# Patient Record
Sex: FEMALE | Race: WHITE
[De-identification: names, ages, dates, MRNs, and addresses within clinical notes are randomized per-mention and may not be internally consistent; named-entity substitution may affect disease eponyms.]

---

## 2017-06-26 ENCOUNTER — HOSPITAL ENCOUNTER (INPATIENT)
Dept: HOSPITAL 76 - ED | Age: 82
LOS: 5 days | Discharge: NURSING, REHAB OR LONG-TERM CARE | DRG: 64 | End: 2017-07-01
Attending: NURSE PRACTITIONER | Admitting: INTERNAL MEDICINE
Payer: MEDICARE

## 2017-06-26 ENCOUNTER — HOSPITAL ENCOUNTER (OUTPATIENT)
Dept: HOSPITAL 76 - EMS | Age: 82
Discharge: ANOTHER HOSPITAL | End: 2017-06-26
Attending: SURGERY
Payer: MEDICARE

## 2017-06-26 DIAGNOSIS — Z82.3: ICD-10-CM

## 2017-06-26 DIAGNOSIS — G47.33: ICD-10-CM

## 2017-06-26 DIAGNOSIS — I63.9: Primary | ICD-10-CM

## 2017-06-26 DIAGNOSIS — I63.511: ICD-10-CM

## 2017-06-26 DIAGNOSIS — I12.9: ICD-10-CM

## 2017-06-26 DIAGNOSIS — K59.00: ICD-10-CM

## 2017-06-26 DIAGNOSIS — E11.22: ICD-10-CM

## 2017-06-26 DIAGNOSIS — E11.65: ICD-10-CM

## 2017-06-26 DIAGNOSIS — M16.10: ICD-10-CM

## 2017-06-26 DIAGNOSIS — M19.019: ICD-10-CM

## 2017-06-26 DIAGNOSIS — I48.2: ICD-10-CM

## 2017-06-26 DIAGNOSIS — Z66: ICD-10-CM

## 2017-06-26 DIAGNOSIS — H53.8: ICD-10-CM

## 2017-06-26 DIAGNOSIS — R51: Primary | ICD-10-CM

## 2017-06-26 DIAGNOSIS — G81.04: ICD-10-CM

## 2017-06-26 DIAGNOSIS — I95.9: ICD-10-CM

## 2017-06-26 DIAGNOSIS — R33.9: ICD-10-CM

## 2017-06-26 DIAGNOSIS — R47.81: ICD-10-CM

## 2017-06-26 DIAGNOSIS — N18.3: ICD-10-CM

## 2017-06-26 DIAGNOSIS — B95.4: ICD-10-CM

## 2017-06-26 DIAGNOSIS — I67.2: ICD-10-CM

## 2017-06-26 DIAGNOSIS — G93.49: ICD-10-CM

## 2017-06-26 DIAGNOSIS — Z79.1: ICD-10-CM

## 2017-06-26 DIAGNOSIS — I66.03: ICD-10-CM

## 2017-06-26 DIAGNOSIS — R13.10: ICD-10-CM

## 2017-06-26 DIAGNOSIS — Z82.49: ICD-10-CM

## 2017-06-26 DIAGNOSIS — I48.91: ICD-10-CM

## 2017-06-26 DIAGNOSIS — N30.00: ICD-10-CM

## 2017-06-26 DIAGNOSIS — R29.810: ICD-10-CM

## 2017-06-26 LAB
ANION GAP SERPL CALCULATED.4IONS-SCNC: 9 MMOL/L (ref 6–13)
BASOPHILS NFR BLD AUTO: 0.1 10^3/UL (ref 0–0.1)
BASOPHILS NFR BLD AUTO: 1 %
BUN SERPL-MCNC: 18 MG/DL (ref 6–20)
CALCIUM UR-MCNC: 9.3 MG/DL (ref 8.5–10.3)
CHLORIDE SERPL-SCNC: 98 MMOL/L (ref 101–111)
CO2 SERPL-SCNC: 28 MMOL/L (ref 21–32)
CREAT SERPLBLD-SCNC: 1.4 MG/DL (ref 0.4–1)
CUL URINE ADD CHARGE: (no result)
EOSINOPHIL # BLD AUTO: 0.1 10^3/UL (ref 0–0.7)
EOSINOPHIL NFR BLD AUTO: 1.1 %
ERYTHROCYTE [DISTWIDTH] IN BLOOD BY AUTOMATED COUNT: 14.9 % (ref 12–15)
GFRSERPLBLD MDRD-ARVRAT: 36 ML/MIN/{1.73_M2} (ref 89–?)
GLUCOSE SERPL-MCNC: 273 MG/DL (ref 70–100)
HCT VFR BLD AUTO: 39.5 % (ref 37–47)
HGB UR QL STRIP: 13.5 G/DL (ref 12–16)
LYMPHOCYTES # SPEC AUTO: 2.2 10^3/UL (ref 1.5–3.5)
LYMPHOCYTES NFR BLD AUTO: 22.9 %
MCH RBC QN AUTO: 29.1 PG (ref 27–31)
MCHC RBC AUTO-ENTMCNC: 34.1 G/DL (ref 32–36)
MCV RBC AUTO: 85.5 FL (ref 81–99)
MONOCYTES # BLD AUTO: 0.7 10^3/UL (ref 0–1)
MONOCYTES NFR BLD AUTO: 7.1 %
NEUTROPHILS # BLD AUTO: 6.5 10^3/UL (ref 1.5–6.6)
NEUTROPHILS # SNV AUTO: 9.5 X10^3/UL (ref 4.8–10.8)
NEUTROPHILS NFR BLD AUTO: 67.9 %
NRBC # BLD AUTO: 0 /100WBC
PDW BLD AUTO: 7.8 FL (ref 7.9–10.8)
PH UR STRIP.AUTO: 6 PH (ref 5–7.5)
POTASSIUM SERPL-SCNC: 3.8 MMOL/L (ref 3.5–5)
RBC MAR: 4.62 10^6/UL (ref 4.2–5.4)
SODIUM SERPLBLD-SCNC: 135 MMOL/L (ref 135–145)
SP GR UR STRIP.AUTO: <=1.005 (ref 1–1.03)
UA CHARGE (STRIP ONLY): (no result)
UA W/ MICROSCOPIC CHARGE: YES
UR CULTURE IF IND: (no result)
UROBILINOGEN UR STRIP.AUTO-MCNC: NEGATIVE MG/DL
WBC # BLD: 9.5 X10^3/UL

## 2017-06-26 PROCEDURE — 99284 EMERGENCY DEPT VISIT MOD MDM: CPT

## 2017-06-26 PROCEDURE — 93005 ELECTROCARDIOGRAM TRACING: CPT

## 2017-06-26 PROCEDURE — 70547 MR ANGIOGRAPHY NECK W/O DYE: CPT

## 2017-06-26 PROCEDURE — 80048 BASIC METABOLIC PNL TOTAL CA: CPT

## 2017-06-26 PROCEDURE — 71020: CPT

## 2017-06-26 PROCEDURE — 81001 URINALYSIS AUTO W/SCOPE: CPT

## 2017-06-26 PROCEDURE — 80061 LIPID PANEL: CPT

## 2017-06-26 PROCEDURE — 70450 CT HEAD/BRAIN W/O DYE: CPT

## 2017-06-26 PROCEDURE — 93306 TTE W/DOPPLER COMPLETE: CPT

## 2017-06-26 PROCEDURE — 99285 EMERGENCY DEPT VISIT HI MDM: CPT

## 2017-06-26 PROCEDURE — 86780 TREPONEMA PALLIDUM: CPT

## 2017-06-26 PROCEDURE — 85025 COMPLETE CBC W/AUTO DIFF WBC: CPT

## 2017-06-26 PROCEDURE — 85730 THROMBOPLASTIN TIME PARTIAL: CPT

## 2017-06-26 PROCEDURE — 81003 URINALYSIS AUTO W/O SCOPE: CPT

## 2017-06-26 PROCEDURE — 84443 ASSAY THYROID STIM HORMONE: CPT

## 2017-06-26 PROCEDURE — 80053 COMPREHEN METABOLIC PANEL: CPT

## 2017-06-26 PROCEDURE — 70551 MRI BRAIN STEM W/O DYE: CPT

## 2017-06-26 PROCEDURE — 36415 COLL VENOUS BLD VENIPUNCTURE: CPT

## 2017-06-26 PROCEDURE — 84484 ASSAY OF TROPONIN QUANT: CPT

## 2017-06-26 PROCEDURE — 96374 THER/PROPH/DIAG INJ IV PUSH: CPT

## 2017-06-26 PROCEDURE — 83036 HEMOGLOBIN GLYCOSYLATED A1C: CPT

## 2017-06-26 PROCEDURE — 96372 THER/PROPH/DIAG INJ SC/IM: CPT

## 2017-06-26 PROCEDURE — 85610 PROTHROMBIN TIME: CPT

## 2017-06-26 PROCEDURE — 96375 TX/PRO/DX INJ NEW DRUG ADDON: CPT

## 2017-06-26 PROCEDURE — 70544 MR ANGIOGRAPHY HEAD W/O DYE: CPT

## 2017-06-26 PROCEDURE — 87086 URINE CULTURE/COLONY COUNT: CPT

## 2017-06-26 PROCEDURE — 51701 INSERT BLADDER CATHETER: CPT

## 2017-06-26 NOTE — XRAY REPORT
EXAM:

CHEST RADIOGRAPHY

 

EXAM DATE: 6/26/2017 10:52 PM.

 

CLINICAL HISTORY: Chest pain.

 

COMPARISON: 06/07/2015.

 

TECHNIQUE: 2 views.

 

FINDINGS: 

Lungs/Pleura: Large lung volumes consistent with emphysema. Minimal bibasilar atelectasis. No pleural
 effusion. No pneumothorax.

 

Mediastinum: Heart and mediastinal contours are unremarkable.

 

Other: None.

 

IMPRESSION: 

1. Large lung volumes consistent with emphysema. 

2. Minimal bibasilar atelectasis is unchanged. 

3. No acute abnormality.

 

[facility]

Referring Provider Line: [phone]

 

SITE ID: [id number]

## 2017-06-26 NOTE — CT REPORT
EXAM:

CT HEAD

 

EXAM DATE: 6/26/2017 10:53 PM.

 

CLINICAL HISTORY: Altered mental status, fever.

 

COMPARISON: None.

 

TECHNIQUE: Multiaxial CT images were obtained from the foramen magnum to the vertex. IV contrast: Non
e. Reformats: Coronal.

 

In accordance with CT protocol optimization, one or more of the following dose reduction techniques w
ere utilized for this exam: automated exposure control, adjustment of mA and/or KV based on patient s
ize, or use of iterative reconstructive technique.

 

FINDINGS:

Parenchyma: No intraparenchymal hemorrhage. No evidence of mass, midline shift, or CT findings of acu
te infarction. Gray-white differentiation is distinct.

 

Extraaxial Spaces: Normal for age. No subdural or epidural collections identified.

 

Ventricles: The ventricles and cortical sulci are mildly enlarged, consistent with age-related tissue
 loss.

 

Sinuses: Postsurgical changes from cataract extraction are noted in the left globe. The paranasal and
 mastoid sinuses are unremarkable.

 

Bones: No evidence of fracture or calvarial defect.

 

Other: Mild diffuse chronic microangiopathic white matter changes are evident. Mild intracranial athe
rosclerosis is noted.

 

IMPRESSION: Generalized age-related cortical atrophic changes without evidence of acute intracranial 
abnormality.

 

[facility]

Referring Provider Line: [phone]

 

SITE ID: [id number]

## 2017-06-26 NOTE — XRAY PRELIMINARY REPORT
Accession: [id number]

Exam: XR Chest 2 View PA/LAT

 

IMPRESSION: 

1. Large lung volumes consistent with emphysema. 

2. Minimal bibasilar atelectasis is unchanged. 

3. No acute abnormality.

 

[facility]

 

SITE ID: [id number]

## 2017-06-26 NOTE — CT PRELIMINARY REPORT
Accession: [id number]

Exam: CT Head W/O

 

IMPRESSION: Generalized age-related cortical atrophic changes without evidence of acute intracranial 
abnormality.

 

[facility]

 

SITE ID: [id number]

## 2017-06-26 NOTE — ED PHYSICIAN DOCUMENTATION
PD HPI FOCAL NEURO





- Stated complaint


Stated Complaint: SLURRED SPEECH, LEFT FACIAL DROOP





- History obtained from


History obtained from: Patient, Family (son), EMS





- History of Present Illness


Timing - onset: Unknown (see below regarding timeframe)


Time of symptom onset unknown: Time of onset unknown


Severity of deficit: Mild


Weakness: Face


Baseline status: positive: A&OX3, ambulatory, indep


Similar symptoms before: Has not had sx before


Recently seen: Not recently seen





- Additional information


Additional information: 





Patient's son last saw patient at her baseline at 9 AM today before he went to 

work. Upon returning home 9:15 PM, he found his mother (patient) was acting 

oddly; answering questions slowly, making references to people that weren't 

there. Medics perceived a left facial droop which appears to have resolved by 

the time of my evaluation. Patient c/o frontal headache, mild chest discomfort 

that is midline without radiation. 





Review of Systems


Constitutional: reports: Reviewed and negative


Eyes: reports: Reviewed and negative


Ears: reports: Reviewed and negative


Nose: reports: Reviewed and negative


Throat: reports: Reviewed and negative


Cardiac: reports: Chest pain / pressure.  denies: Palpitations


Respiratory: reports: Reviewed and negative


GI: reports: Reviewed and negative


: reports: Reviewed and negative


Skin: reports: Reviewed and negative


Musculoskeletal: reports: Reviewed and negative


Neurologic: reports: Confused, Altered mental status, Headache.  denies: 

Generalized weakness, Focal weakness, Numbness





PD PAST MEDICAL HISTORY





- Past Medical History


Past Medical History: Yes


Other Past Medical History: "prediabetes"





- Past Surgical History


Past Surgical History: No





- Present Medications


Home Medications: 


 Ambulatory Orders











 Medication  Instructions  Recorded  Confirmed


 


Naproxen Sodium [Aleve] 220 mg PO BID PRN 06/27/17 06/27/17














- Allergies


Allergies/Adverse Reactions: 


 Allergies











Allergy/AdvReac Type Severity Reaction Status Date / Time


 


Sulfa (Sulfonamide Allergy  Unknown Verified 06/07/15 14:09





Antibiotics)     














- Living Situation


Living Situation: reports: With family


Living Arrangement: reports: At home





- Social History


Does the pt smoke?: No





PD ED PE NORMAL





- Vitals


Vital signs reviewed: Yes





- General


General: Alert and oriented X 3, No acute distress, Well developed/nourished





- HEENT


HEENT: Atraumatic, PERRL, EOMI, Moist mucous membranes





- Neck


Neck: Supple, no meningeal sign





- Cardiac


Cardiac: RRR, No murmur





- Respiratory


Respiratory: No respiratory distress, Clear bilaterally





- Abdomen


Abdomen: Soft, Non tender





- Back


Back: No CVA TTP





- Derm


Derm: Normal color, Warm and dry





- Extremities


Extremities: No edema





- Neuro


Neuro: Alert and oriented X 3, CNs 2-12 intact, No motor deficit, No sensory 

deficit, Normal speech





NIHSS





- Level of Consciousness


Level of consciousness: (0) Alert, Keenly responsive


LOC Questions: (0) Answers both Q's correct





- Gaze


Best Gaze: (0) Normal





- Visual


Visual: (0) No loss





- Facial Palsy


Facial Palsy: (0) Normal, symmetrical movement





- Motor Arms (both separate)


Motor Arm (right): (0) No drift


Motor Arm (left): (0) No drift





- Motor Legs (both separate)


Motor Leg (right): (1) Drift


Motor Leg (left): (1) Drift





- Limb Ataxia


Limb Ataxia: (0) Absent





- Sensory


Sensory: (0) Normal





- Best Language


Best Language: (0) No aphasia





- Dysarthria


Dysarthria: (0) Normal





- Extinction and Inattention (formally neg


Extinction and inattention: (0) No abnormality





Results





- Vitals


Vitals: 


 Vital Signs - 24 hr











  06/26/17





  22:23


 


Temperature 36.6 C


 


Heart Rate 86


 


Respiratory 18





Rate 


 


Blood Pressure 190/83 H


 


O2 Saturation 98








 Oxygen











O2 Source                      Room air

















- EKG (time done)


  ** No standard instances


Rate: Rate (enter#) (81)


Rhythm: Atrial fibrillation


Axis: Normal


QRS: Normal


Ischemia: Normal ST segments





- Labs


Labs: 


 Laboratory Tests











  06/26/17 06/26/17 06/26/17





  23:07 23:39 23:39


 


WBC   9.5 


 


RBC   4.62 


 


Hgb   13.5 


 


Hct   39.5 


 


MCV   85.5 


 


MCH   29.1 


 


MCHC   34.1 


 


RDW   14.9 


 


Plt Count   186 


 


MPV   7.8 L 


 


Neut #   6.5 


 


Lymph #   2.2 


 


Mono #   0.7 


 


Eos #   0.1 


 


Baso #   0.1 


 


Absolute Nucleated RBC   0.00 


 


Nucleated RBCs   0.0 


 


PT   


 


INR   


 


APTT   


 


Sodium    135


 


Potassium    3.8


 


Chloride    98 L


 


Carbon Dioxide    28


 


Anion Gap    9.0


 


BUN    18


 


Creatinine    1.4 H


 


Estimated GFR (MDRD)    36 L


 


Glucose    273 H


 


Calcium    9.3


 


Troponin I   


 


Urine Color  LT. YELLOW  


 


Urine Clarity  CLOUDY  


 


Urine pH  6.0  


 


Ur Specific Gravity  <=1.005  


 


Urine Protein  30 H  


 


Urine Glucose (UA)  100 H  


 


Urine Ketones  NEGATIVE  


 


Urine Occult Blood  TRACE-INTA  


 


Urine Nitrite  NEGATIVE  


 


Urine Bilirubin  NEGATIVE  


 


Urine Urobilinogen  0.2 (NORMAL)  


 


Ur Leukocyte Esterase  SMALL H  


 


Urine RBC  0-5  


 


Urine WBC  11-25 H  


 


Urine WBC Clumps  PRESENT  


 


Ur Squamous Epith Cells  FEW Squamous  


 


Urine Bacteria  Moderate H  


 


Ur Microscopic Review  INDICATED  


 


Urine Culture Comments  INDICATED  














  06/26/17 06/26/17





  23:39 23:39


 


WBC  


 


RBC  


 


Hgb  


 


Hct  


 


MCV  


 


MCH  


 


MCHC  


 


RDW  


 


Plt Count  


 


MPV  


 


Neut #  


 


Lymph #  


 


Mono #  


 


Eos #  


 


Baso #  


 


Absolute Nucleated RBC  


 


Nucleated RBCs  


 


PT   12.6


 


INR   1.1


 


APTT   25.1


 


Sodium  


 


Potassium  


 


Chloride  


 


Carbon Dioxide  


 


Anion Gap  


 


BUN  


 


Creatinine  


 


Estimated GFR (MDRD)  


 


Glucose  


 


Calcium  


 


Troponin I  < 0.04 


 


Urine Color  


 


Urine Clarity  


 


Urine pH  


 


Ur Specific Gravity  


 


Urine Protein  


 


Urine Glucose (UA)  


 


Urine Ketones  


 


Urine Occult Blood  


 


Urine Nitrite  


 


Urine Bilirubin  


 


Urine Urobilinogen  


 


Ur Leukocyte Esterase  


 


Urine RBC  


 


Urine WBC  


 


Urine WBC Clumps  


 


Ur Squamous Epith Cells  


 


Urine Bacteria  


 


Ur Microscopic Review  


 


Urine Culture Comments  














- Rads (name of study)


  ** chest xray


Radiology: Prelim report reviewed, See rad report





  ** CT head


Radiology: Prelim report reviewed, See rad report





PD MEDICAL DECISION MAKING





- ED course


Complexity details: reviewed results, re-evaluated patient, considered 

differential, d/w patient, d/w family


ED course: 





On reevaluation, patient is removing cardiac leads and continues to do so when 

I ask her to please leave them in place. She is hypokinetic, answers questions 

slowly, quietly, and sometimes does not answer my questions at all. She does 

not appear angry or upset; possibly confused, frequently looks around the room 

during conversation. Her son is able to redirect her and get her to stop taking 

off the leads and lie back down on the stretcher. Patient told the ED RN that 

she (patient) was in the ED to visit a relative who is in the hospital; patient'

s son says this relative has been dead for some time now. Thus, patient's 

mental status is fluctuating in how altered it is. 





Departure





- Departure


Disposition: ED Place in Observation


Clinical Impression: 


Altered mental status


Qualifiers:


 Altered mental status type: unspecified Qualified Code(s): R41.82 - Altered 

mental status, unspecified





Urinary tract infection


Qualifiers:


 Urinary tract infection type: acute cystitis Hematuria presence: without 

hematuria Qualified Code(s): N30.00 - Acute cystitis without hematuria





Atrial fibrillation


Qualifiers:


 Atrial fibrillation type: unspecified Qualified Code(s): I48.91 - Unspecified 

atrial fibrillation


Condition: Stable


Discharge Date/Time: 06/27/17 02:03

## 2017-06-27 LAB
ALBUMIN/GLOB SERPL: 1.3 {RATIO} (ref 1–2.2)
ANION GAP SERPL CALCULATED.4IONS-SCNC: 11 MMOL/L (ref 6–13)
ANION GAP SERPL CALCULATED.4IONS-SCNC: 13 MMOL/L (ref 6–13)
APTT PPP: 25.1 SECS (ref 24.9–33.3)
BASOPHILS NFR BLD AUTO: 0.1 10^3/UL (ref 0–0.1)
BASOPHILS NFR BLD AUTO: 0.9 %
BILIRUB BLD-MCNC: 0.9 MG/DL (ref 0.2–1)
BUN SERPL-MCNC: 15 MG/DL (ref 6–20)
BUN SERPL-MCNC: 18 MG/DL (ref 6–20)
CALCIUM UR-MCNC: 8.9 MG/DL (ref 8.5–10.3)
CALCIUM UR-MCNC: 9.3 MG/DL (ref 8.5–10.3)
CHLORIDE SERPL-SCNC: 101 MMOL/L (ref 101–111)
CHLORIDE SERPL-SCNC: 102 MMOL/L (ref 101–111)
CHOLEST SERPL-MCNC: 182 MG/DL
CO2 SERPL-SCNC: 23 MMOL/L (ref 21–32)
CO2 SERPL-SCNC: 25 MMOL/L (ref 21–32)
CREAT SERPLBLD-SCNC: 1.3 MG/DL (ref 0.4–1)
CREAT SERPLBLD-SCNC: 1.5 MG/DL (ref 0.4–1)
EOSINOPHIL # BLD AUTO: 0.1 10^3/UL (ref 0–0.7)
EOSINOPHIL NFR BLD AUTO: 1.4 %
ERYTHROCYTE [DISTWIDTH] IN BLOOD BY AUTOMATED COUNT: 14.5 % (ref 12–15)
EST. AVERAGE GLUCOSE BLD GHB EST-MCNC: 186 MG/DL (ref 70–100)
GFRSERPLBLD MDRD-ARVRAT: 33 ML/MIN/{1.73_M2} (ref 89–?)
GFRSERPLBLD MDRD-ARVRAT: 39 ML/MIN/{1.73_M2} (ref 89–?)
GLOBULIN SER-MCNC: 3.2 G/DL (ref 2.1–4.2)
GLUCOSE SERPL-MCNC: 178 MG/DL (ref 70–100)
GLUCOSE SERPL-MCNC: 206 MG/DL (ref 70–100)
HBA1C BLD-MCNC: 0.96 G/DL
HCT VFR BLD AUTO: 39.4 % (ref 37–47)
HDLC SERPL-MCNC: 36 MG/DL
HDLC SERPL: 5.1 {RATIO} (ref ?–4.4)
HGB UR QL STRIP: 13.5 G/DL (ref 12–16)
INR PPP: 1.1 (ref 0.8–1.2)
INR PPP: 1.1 (ref 0.8–1.2)
LDLC/HDLC SERPL: 3.3 {RATIO} (ref ?–4.4)
LYMPHOCYTES # SPEC AUTO: 2.5 10^3/UL (ref 1.5–3.5)
LYMPHOCYTES NFR BLD AUTO: 24.7 %
MCH RBC QN AUTO: 29.3 PG (ref 27–31)
MCHC RBC AUTO-ENTMCNC: 34.4 G/DL (ref 32–36)
MCV RBC AUTO: 85.2 FL (ref 81–99)
MONOCYTES # BLD AUTO: 0.8 10^3/UL (ref 0–1)
MONOCYTES NFR BLD AUTO: 7.7 %
NEUTROPHILS # BLD AUTO: 6.6 10^3/UL (ref 1.5–6.6)
NEUTROPHILS # SNV AUTO: 10.1 X10^3/UL (ref 4.8–10.8)
NEUTROPHILS NFR BLD AUTO: 65.3 %
NRBC # BLD AUTO: 0 /100WBC
PDW BLD AUTO: 8.1 FL (ref 7.9–10.8)
POTASSIUM SERPL-SCNC: 3.5 MMOL/L (ref 3.5–5)
POTASSIUM SERPL-SCNC: 3.9 MMOL/L (ref 3.5–5)
PROT SPEC-MCNC: 7.4 G/DL (ref 6.7–8.2)
PROTHROM ACT/NOR PPP: 12.6 SECS (ref 9.9–12.6)
PROTHROM ACT/NOR PPP: 12.6 SECS (ref 9.9–12.6)
RBC MAR: 4.62 10^6/UL (ref 4.2–5.4)
SODIUM SERPLBLD-SCNC: 135 MMOL/L (ref 135–145)
SODIUM SERPLBLD-SCNC: 140 MMOL/L (ref 135–145)
TRIGL P FAST SERPL-MCNC: 139 MG/DL
VLDLC SERPL-SCNC: 28 MG/DL
WBC # BLD: 10.1 X10^3/UL

## 2017-06-27 RX ADMIN — Medication SCH MG: at 08:39

## 2017-06-27 RX ADMIN — INSULIN ASPART SCH UNIT: 100 INJECTION, SOLUTION INTRAVENOUS; SUBCUTANEOUS at 21:57

## 2017-06-27 RX ADMIN — SODIUM CHLORIDE SCH MLS/HR: 9 INJECTION, SOLUTION INTRAVENOUS at 13:54

## 2017-06-27 RX ADMIN — POLYETHYLENE GLYCOL 3350 SCH GM: 17 POWDER, FOR SOLUTION ORAL at 08:39

## 2017-06-27 RX ADMIN — Medication SCH: at 20:53

## 2017-06-27 RX ADMIN — SODIUM CHLORIDE, PRESERVATIVE FREE SCH: 5 INJECTION INTRAVENOUS at 21:59

## 2017-06-27 RX ADMIN — PHENAZOPYRIDINE SCH MG: 100 TABLET ORAL at 21:58

## 2017-06-27 RX ADMIN — SODIUM CHLORIDE, PRESERVATIVE FREE SCH: 5 INJECTION INTRAVENOUS at 14:00

## 2017-06-27 RX ADMIN — SODIUM CHLORIDE SCH MLS/HR: 9 INJECTION, SOLUTION INTRAVENOUS at 12:11

## 2017-06-27 RX ADMIN — ASPIRIN SCH MG: 81 TABLET, CHEWABLE ORAL at 12:26

## 2017-06-27 RX ADMIN — INSULIN ASPART SCH UNIT: 100 INJECTION, SOLUTION INTRAVENOUS; SUBCUTANEOUS at 08:42

## 2017-06-27 RX ADMIN — INSULIN ASPART SCH UNIT: 100 INJECTION, SOLUTION INTRAVENOUS; SUBCUTANEOUS at 16:52

## 2017-06-27 RX ADMIN — INSULIN ASPART SCH UNIT: 100 INJECTION, SOLUTION INTRAVENOUS; SUBCUTANEOUS at 12:11

## 2017-06-27 RX ADMIN — SODIUM CHLORIDE, PRESERVATIVE FREE SCH: 5 INJECTION INTRAVENOUS at 05:10

## 2017-06-27 RX ADMIN — SODIUM CHLORIDE SCH MLS/HR: 9 INJECTION, SOLUTION INTRAVENOUS at 02:21

## 2017-06-27 RX ADMIN — ENOXAPARIN SODIUM SCH MG: 100 INJECTION SUBCUTANEOUS at 08:42

## 2017-06-27 RX ADMIN — ATORVASTATIN CALCIUM SCH MG: 40 TABLET, FILM COATED ORAL at 21:42

## 2017-06-27 NOTE — MRI PRELIMINARY REPORT
Accession: [id number]

Exam: MRI Brain W/O

 

IMPRESSION: 

1. 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 
right peritrigonal white matter. Distribution corresponds to right MCA and/or right MCA watershed ter
ritories. No evidence of corresponding hemorrhage. 

2. Absence of the right MCA flow void, concerning for occlusion.

 

[facility]

 

The above findings  were discussed with Dr. Riggins by Dr. Maxime Vargas at 11:50 hrs on 6/27/17.

 

SITE ID: [id number]

## 2017-06-27 NOTE — MRI PRELIMINARY REPORT
Accession: [id number]

Exam: MRI Angio Brain W/O (MRA)

 

IMPRESSION: 

1. 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 No reconstitution is visualized. 

2. High-grade stenosis (greater than 75% luminal narrowing) and moderate to high-grade stenosis (50-7
0% luminal narrowing) of the left MCA frontal and temporal M2 trunks, respectively. 

3. High-grade stenosis or occlusion of the PCAs bilaterally with reconstitution of distal branches, r
ight greater than left.

 

CRITICAL RESULT: The findings were discussed with Dr. Riggins at 11:50 on 06/27/2017.

 

[facility]

 

SITE ID: [id number]

## 2017-06-27 NOTE — HISTORY & PHYSICAL EXAMINATION
Chief Complaint





- Chief Complaint


Chief Complaint: altered mental status





History of Present Illness





- Admitted From


Admitted From:: emergency department





- History Obtained From


Records Reviewed: yes


History obtained from: patient and patient's son


Exam Limitations: poor historian secondary to altered mental status





- History of Present Illness


HPI Comment/Other: 





Patient is an 83-year-old female with a past medical history significant for 

diabetes not on any treatment, osteoarthritis of the shoulder and hip on daily 

Aleve who does not regularly see a doctor presents to the emergency room 

accompanied by her son who states that she has had altered mental status since 

this evening. The history is mostly provided by the patient's son as the 

patient was confused. The patient's son states that the patient was in her 

normal state of health at 10 AM this morning when he left the house. He states 

he returned home at 9 PM this evening and noticed his mom was very confused. He 

states that he would ask her questions and she will be very slow to respond. 

She would respond with answers that often did not make any sense. He states 

that she almost looked as though she was in a daze. He states that she was 

staring off into space. He describes it as "she appeared to be stone". He 

states he did not notice any facial droop and she did not have any focal 

weakness. He states that the only thing she complained of was a headache. He 

states that she was very slow to move. He states at 9:30 he called 911 because 

this was very odd for her. He states that she has not been complaining of any 

cough, fever, chills, urinary symptoms, abdominal pain, and he has not noted 

any nausea vomiting or diarrhea over the last several days. He states that she 

was in her normal state of health this morning. The patient herself denies any 

problems aside from a frontal headache. She cannot tell me how long it's been 

going on but she states it hurts. The patient's son states that his mom asked 

him earlier how long it had been since he saw his neighbor who  6 months 

ago. Also when I asked the patient why she was at the hospital she stated that 

she was here to see a family member whom her son stated had also passed away 

many years ago. The son states that this is nothing like her norm. He stated 

that she does tend to be forgetful but she is fully aware that these people 

have passed away. He states that she is completely independent she still 

performs all her ADLs independently and she still drives.





On presentation to the emergency department the patient was afebrile and vital 

signs were normal aside from the fact that her blood pressure was very elevated 

at 190/83. The patient's lab work revealed a normal WBC, normal hemoglobin, 

normal INR and normal electrolytes aside from slightly elevated creatinine of 

1.4 and a glucose of 273. The patient's troponin was negative. Her UA did have 

small leukocyte Estrace, 11-25 WBCs and moderate bacteria. The patient's CT 

head showed age-related cortical atrophic changes without evidence of acute 

intracranial abnormality. The patient's chest x-ray reveals large lung volumes 

consistent with emphysema but no acute abnormality. The patient's mentation did 

not improve back to her baseline during the time she spent in the emergency 

department. She was started on IV antibiotics for treatment of a urinary tract 

infection. She was placed in observation for acute encephalopathy and will be 

monitored overnight.





Review of Systems





- Constitutional


Constitutional: denies: Fatigue, Fever, Chills, Malaise, Weakness, Poor appetite

, Diaphoresis, Night sweats, Weight gain, Weight loss





- Eyes


Eyes: denies: Pain, Irritation, Amaurosis, Blurred vision, Spots in vision, 

Field loss, Vision loss, Dipolpia





- Ears, Nose & Throat


Ears, Nose & Throat: denies: Ear pain, Hearing loss, Hearing aids, Tinnitus, 

Vertigo, Nasal pain, Nasal discharge, Nosebleeds, Nasal obstruction, Nasal 

congestion, Postnasal drainage, Sore throat, Hoarseness, Mouth lesions





- Cardiovascular


Cariovascular: denies: Irregular heart rate, Palpitations, Chest pain, Edema, 

Lightheadedness, Syncope, Exertional dyspnea, Decr. exercise tolerance, 

Orthopnea





- Respiratory


Respiratory: denies: Cough, Sputum production, Wheezing, Snoring, Hemoptysis, 

Orthopnea, SOB at rest, SOB with exertion, Pleuritic pain





- Gastrointestinal


Gastrointestinal: denies: Abdominal pain, Abdominal distention, Constipation, 

Diarrhea, Change in bowel habits, Black stools, Bloody stools, Nausea, Vomiting

, Bile emesis, Coffee grounds emesis, Poor appetite





- Genitourinary


Genitourinary: denies: Dysuria, Frequency, Urgency, Hematuria, Nocturia





- Musculoskeletal


Musculoskeletal: reports: Muscle aches, Joint pain.  denies: Muscle pain, Back 

pain, Stiffness, Limited range of motion, Muscle weakness, Joint swelling





- Integumentary


Integumentary: denies: Rash, Pruritis, Lesions, Dryness, Pigment changes, Nail 

changes





- Neurological


Neurological: reports: Headache (Frontal), Memory problems, Other (confusion).  

denies: General weakness, Focal weakness, Dizziness, Abnormal gait, Seizures, 

Slurred speech





- Psychiatric


Psychiatric: denies: Depression, Anxiety, Delusions





- Endocrine


Endocrine: denies: Polyuria, Polydypsia, Polyphagia, Intolerance to cold, 

Intolerance to heat





- Hematologic/Lymphatic


Hematologic/Lymphatic: denies: Anemia, Bruising





History





- Past Medical History


Cardiovascular: reports: Hypertension


Respiratory: reports: None


Neuro: reports: None


Endocrine/Autoimmune: reports: Type 2 diabetes


GI: reports: None


GYN: reports: None


: reports: None


HEENT: reports: None


Psych: reports: None


Musculoskeletal: reports: Osteoarthritis


Derm: reports: None


MRSA Hx?: No


Other Past Medical History: 1. Diabetes not on any treatment.  2. Chronic 

kidney disease.  3. Osteoarthritis shoulder and hip.  4. Atrial fibrillation 

not on Coumadin or rate control medication.  5. Hypertension





- Past Surgical History


General: reports: Cholecystectomy, Appendectomy


/GYN: reports: Hysterectomy


HEENT: reports: Tonsil/Adenoidectomy





- Family & Social History


Family History: Mother: CVA/TIA (Stroke in 80s), Father: CAD, Brother:  

(Old age)


Family History Comment/Other: 





The patient's mother  of a stroke in her 80s. The patient's father  of 

an MI. Her brother  of old age. There is no family history of cancer or 

diabetes


Living arrangement: At home


Living Situation: With family (Son)


Social History Notes: Patient lives and [place] with her son. Her son moved 

in with her 2 years ago he previously lived in [place]. Patient grew up in 

[place] she also lived in [place] and in [place] for some time. She was  

for over 50 years and is now  her  passed away 3-1/2 years ago. 

She is fully independent, performs all her ADLs independently and still drives. 

She has 2 sons one of whom lives in [facility] the other lives with 

her. The patient has never smoked she rarely drinks alcohol and denies any 

illicit drug use.





- Substance History


Use: Uses substance without health or social issues: NONE


Abuse: Recurrent use of substance despite neg consequences: NONE


Dependence: Experiences withdrawal or developed tolerances: NONE





- POLST


Patient has POLST: No


POLST Status: Full Code





Meds/Allgy





- Home Medications


Home Medications: 


 Ambulatory Orders











 Medication  Instructions  Recorded  Confirmed


 


Naproxen Sodium [Aleve] 220 mg PO BID PRN 17














- Allergies


Allergies/Adverse Reactions: 


 Allergies











Allergy/AdvReac Type Severity Reaction Status Date / Time


 


Sulfa (Sulfonamide Allergy  Unknown Verified 06/07/15 14:09





Antibiotics)     














Exam





- Vital Signs


Reviewed Vital Signs: Yes


Vital Signs: 





 Vital Signs x48h











  Pulse Resp BP Pulse Ox


 


 17 02:18    210/71 H 


 


 17 01:45  85  18  205/88 H  94














- Physical Exam


General Appearance: positive: Alert, Other (Patient is confused, she thinks she 

is in the hospital to see a relative who has already passed away. She can't 

tell me the year, where she is, and her name. At time she is slow to process 

and slow to answer questions.)


Eyes Bilateral: positive: Normal inspection, PERRL, EOMI, No lid inflammation, 

Conjunctivae nml, No scleral icterus


ENT: positive: ENT inspection nml, Pharynx nml, No signs of dehydration.  

negative: Purulent nasal drainage, Pharyngeal erythema, Oral lesions


Neck: positive: Nml inspection, Thyroid nml, No JVD, Trachea midline.  negative

: Thyromegaly, Lymphadenopathy (R), Lymphadenopathy (L)


Respiratory: positive: Chest non-tender, No respiratory distress, Breath sounds 

nml.  negative: Wheezes, Rales, Rhonchi


Cardiovascular: positive: No murmur, No gallop, Irregularly irregular


Peripheral Pulses: positive: 2+


Abdomen: positive: Non-tender, No organomegaly, Nml bowel sounds, No 

distention.  negative: Guarding, Rebound, Hepatomegaly


Back: positive: Nml inspection.  negative: CVA tenderness (R), CVA tenderness (L

)


Skin: positive: Color nml, No rash, Warm.  negative: Cyanosis, Pallor


Extremities: positive: Non-tender, Full ROM, Nml appearance, No pedal edema


Neurologic/Psychiatric: positive: Oriented x3, CN's nml (2-12), Motor nml, 

Sensation nml, Mood/affect nml, Other (Slow to answer questions. Confused. Poor 

memory.)





Conclusion/Plan





- Problem List


(1) Acute encephalopathy


Conclusion/Plan: 


Patient appears to have waxing and waning encephalopathy. Which appears to be 

worse at times. She does not appear to have any focal neurologic deficits. 

Although according to the paramedics she did have a slight left facial droop 

which had completely resolved by the time she came into the emergency 

department.


The patient appears to be confused and at times is slow to process and slow to 

answer questions. She does complain of a frontal headache.





The etiology of the patient's acute encephalopathy is not completely obvious as 

she did not have any major electrolyte abnormalities. She does not have a focal 

neurologic deficit. She does appear to have a UTI however she does not have a 

leukocytosis or fever or any signs of sepsis. She does have an elevated blood 

sugar but has a history of diabetes and does not appear to be in DKA. She is 

very hypertensive on presentation to the emergency department and continues to 

be hypertensive on presentation to the medical del real. It is possible that the 

patient could have hypertensive encephalopathy especially given her frontal 

headache. The patient has untreated diabetes and possibly also has untreated 

hypertension and therefore has risk factors for TIA/stroke. It is possible that 

the patient symptoms are secondary to TIA or stroke. The patient's CT head was 

negative and chest x-ray was negative.Patient could also be having subclinical 

seizures.





Plan:


Place patient in observation


ASA, lipitor, lipid profile


Neurochecks


Treat for UTI with IV ceftriaxone


Give IV fluids


Check TSH, B12, folate, RPR


Check U. tox


CTA head and neck, MRI of brain


Echocardiogram


Consider LP if patient is not improving


Consider EEG and neurology consultation if not improving








(2) Urinary tract infection


Conclusion/Plan: 


Patient presented with acute encephalopathy and was found to have positive UA 

although she denies any urinary symptoms. She has normal WBC and no fevers.





Plan:


Treat with IV ceftriaxone


Await urine culture


Qualifiers: 


   Urinary tract infection type: acute cystitis   Hematuria presence: without 

hematuria   Qualified Code(s): N30.00 - Acute cystitis without hematuria   





(3) Hypertension


Conclusion/Plan: 


Patient's blood pressure is very elevated in the emergency department and 

continued to be elevated on presentation to [facility] with systolic blood 

pressure greater than 200.


This could be secondary to undiagnosed hypertension or secondary to ongoing 

stroke.


Patient's encephalopathy could be secondary to hypertensive emergency but could 

also be secondary to a TIA or stroke and hypertension could also be secondary 

to TIA or stroke.





Plan:


Place patient on IV hydralazine when necessary to maintain blood pressure less 

than 190 systolic and 105 diastolic


This will allow for permissive hypertension and should also bring blood 

pressure down to an adequate range.


Monitor


Consider starting oral antihypertensive








(4) Diabetes


Conclusion/Plan: 


Patient has history of diabetes but has never been on any medications. Patient 

does not regularly followup with her PCP.


On presentation patient's blood sugar is elevated at 273.


Hemoglobin A1c is 8.1





Plan:


Diabetic diet


Diabetic education once mental status improves


Sliding scale insulin


Blood sugar checks a.c. at bedtime


Patient will need to be started on metformin at discharge


Qualifiers: 


   Diabetes mellitus type: type 2 





(5) Atrial fibrillation


Conclusion/Plan: 


Patient appears to be in atrial fibrillation on EKG although rate is controlled.


There do not appear to be any P waves and she has an irregular rhythm.





Plan:


Monitor on telemetry


Echocardiogram


If patient has had a stroke or TIA will need to consider anticoagulation


Patient will need outpatient cardiology followup


Qualifiers: 


   Atrial fibrillation type: unspecified   Qualified Code(s): I48.91 - 

Unspecified atrial fibrillation   





(6) CKD (chronic kidney disease)


Conclusion/Plan: 


Patient's creatinine is 1.4 on presentation


GFR is 36


She has a CKD stage III


Likely secondary to diabetes and possibly hypertension





Plan:


Monitor creatinine


Avoid nephrotoxic agents


Give IV fluids


Qualifiers: 


   Chronic kidney disease stage: stage 3 (moderate)   Qualified Code(s): N18.3 

- Chronic kidney disease, stage 3 (moderate)   





(7) Prophylactic use of low molecular weight heparin for venous thromboembolism


Conclusion/Plan: 


Placed on Lovenox while hospitalized








- Lab Results


Lab results reviewed: Yes


Fabian Bones: 


 17 23:39





 17 23:39





- Diagnostic Imaging Results


Diagnostic Imaging Results: positive: Final report reviewed





- EKG Results


EKG Interpreted Independently: Yes





Issues/Core Measures





- Anticipated LOS


Anticipated Stay Length: Less than 2 midnights





- DVT/VTE - Prophylaxis


VTE/DVT Prophylaxis med ordered at admit?: Yes

## 2017-06-27 NOTE — PROVIDER PROGRESS NOTE
Subjective





- Prog Note Date


Prog Note Date: 06/27/17


Prog Note Time: 07:24





- Subjective


Subjective: 





H/P, data reviewed


I'm at [place], 2017 (later in day says its 1998 or orther year


identifies son,








Current Medications





- Current Medications


Current Medications: 








Active Medications











Generic Name Dose Route Start Last Admin





  Trade Name Freq  PRN Reason Stop Dose Admin


 


Acetaminophen  650 mg 06/27/17 01:10  





  Tylenol  PO   





  Q4HR PRN   





  Pain 1 to 4   


 


Aspirin  325 mg 06/27/17 08:00  





  Ti  PO   





  DAILYWM RAYNA   


 


Atorvastatin Calcium  80 mg 06/27/17 21:00  





  Lipitor  PO   





  QPM RAYNA   


 


Enoxaparin Sodium  40 mg 06/27/17 09:00  





  Lovenox  SUBQ   





  DAILY RAYNA   


 


Hydralazine HCl  10 mg 06/27/17 01:16 06/27/17 02:18





  Apresoline Inj  IVP   10 mg





  Q6H PRN   Administration





  Hypertension   


 


Sodium Chloride  1,000 mls @ 100 mls/hr 06/27/17 02:00 06/27/17 02:21





  Normal Saline 0.9%  IV   100 mls/hr





  .Q10H RAYNA   Administration


 


Ceftriaxone Sodium 1 gm/  100 mls @ 200 mls/hr 06/28/17 01:00  





  Sodium Chloride  IV   





  Q24H RAYNA   


 


Insulin Aspart  1 - 5 unit 06/27/17 08:00  





  Novolog  SUBQ   





  0800,1200,1700,2100 RAYNA   





  Protocol   


 


Ondansetron HCl  4 mg 06/27/17 01:10  





  Zofran Inj  IVP   





  Q6HR PRN   





  Nausea / Vomiting   


 


Oxycodone HCl  5 mg 06/27/17 01:10  





  Roxicodone  PO   





  Q4HR PRN   





  Pain 5 to 7   


 


Pantoprazole Sodium  40 mg 06/27/17 07:00 06/27/17 06:11





  Protonix  PO   40 mg





  QDAC RAYNA   Administration


 


Polyethylene Glycol  17 gm 06/27/17 09:00  





  Miralax  PO   





  DAILY RAYNA   


 


Saccharomyces Boulardii  250 mg 06/27/17 08:00  





  Florastor  PO   





  BIDWM RAYNA   


 


Sodium Chloride  10 ml 06/27/17 01:10  





  Normal Saline Flush 0.9%  IVP   





  PRN PRN   





  AS NEEDED PER PROVIDER ORDERS   


 


Sodium Chloride  10 ml 06/27/17 06:00 06/27/17 05:10





  Normal Saline Flush 0.9%  IVP   Not Given





  Q8HR RAYNA   








 





Naproxen Sodium [Aleve] 220 mg PO BID PRN 06/27/17 











Objective





- Vital Signs/Intake & Output


Reviewed Vital Signs: Yes


Vital Signs: 





 Vital Signs x48h











  Temp Pulse Pulse Pulse Resp BP BP


 


 06/27/17 05:39  36.8 C   61   18   180/84 H


 


 06/27/17 03:17        200/82 H


 


 06/27/17 02:57        188/72 H


 


 06/27/17 02:48       190/70 H 


 


 06/27/17 02:43        193/77 H


 


 06/27/17 02:40        197/76 H


 


 06/27/17 02:34        182/80 H


 


 06/27/17 02:26     64    170/92 H


 


 06/27/17 02:24  36.8 C    65  18   210/71 H


 


 06/27/17 02:18       210/71 H 


 


 06/27/17 01:45   85    18  205/88 H 














  Pulse Ox


 


 06/27/17 05:39  97


 


 06/27/17 03:17 


 


 06/27/17 02:57 


 


 06/27/17 02:48 


 


 06/27/17 02:43 


 


 06/27/17 02:40 


 


 06/27/17 02:34 


 


 06/27/17 02:26 


 


 06/27/17 02:24  98


 


 06/27/17 02:18 


 


 06/27/17 01:45  94











Intake & Output: 





 Intake & Output











 06/24/17 06/25/17 06/26/17 06/27/17





 23:59 23:59 23:59 23:59


 


Intake Total    487


 


Balance    487














- Objective


Neurologic/Psychiatric: positive: Other (awake, alert, slow to respond to some 

questions, answers other questions quickly said it was march, but 2017, [facility] 

president, [facility] previously, in [place] R gaze preference, strenght exam (B 

hand , shoulder adduction/abduction, hip flexion extension, dorsi plantar 

flexion AND shoulder shrug all with subtle reduction on L (5/5 R, 4/5 L)   

extraocular movements intact, pupils equal and reactive no obvius facial droop)





- Lab Results


Fish Bones: 


 06/28/17 05:30





 06/28/17 05:30


Other Labs: 





 Lab Results x24hrs











  06/27/17 06/27/17 06/27/17 Range/Units





  05:05 05:05 05:05 


 


WBC     (4.8-10.8)  x10^3/uL


 


RBC     (4.20-5.40)  10^6/uL


 


Hgb     (12.0-16.0)  g/dL


 


Hct     (37.0-47.0)  %


 


MCV     (81.0-99.0)  fL


 


MCH     (27.0-31.0)  pg


 


MCHC     (32.0-36.0)  g/dL


 


RDW     (12.0-15.0)  %


 


Plt Count     (130-450)  10^3/uL


 


MPV     (7.9-10.8)  fL


 


Neut #     (1.5-6.6)  10^3/uL


 


Lymph #     (1.5-3.5)  10^3/uL


 


Mono #     (0.0-1.0)  10^3/uL


 


Eos #     (0.0-0.7)  10^3/uL


 


Baso #     (0.0-0.1)  10^3/uL


 


Absolute Nucleated RBC     x10^3/uL


 


Nucleated RBCs     /100WBC


 


PT     (9.9-12.6)  secs


 


INR     (0.8-1.2)  


 


Sodium    140  (135-145)  mmol/L


 


Potassium    3.5  (3.5-5.0)  mmol/L


 


Chloride    102  (101-111)  mmol/L


 


Carbon Dioxide    25  (21-32)  mmol/L


 


Anion Gap    13.0  (6-13)  


 


BUN    15  (6-20)  mg/dL


 


Creatinine    1.3 H  (0.4-1.0)  mg/dL


 


Estimated GFR (MDRD)    39 L  (>89)  


 


Glucose    178 H  ()  mg/dL


 


Glycated Hemoglobin     (4.6-6.2)  %


 


Estim Average Glucose     ()  


 


Calcium    9.3  (8.5-10.3)  mg/dL


 


Total Bilirubin    0.9  (0.2-1.0)  mg/dL


 


AST    21  (10-42)  IU/L


 


ALT    14  (10-60)  IU/L


 


Alkaline Phosphatase    63  ()  IU/L


 


Total Protein    7.4  (6.7-8.2)  g/dL


 


Albumin    4.2  (3.2-5.5)  g/dL


 


Globulin    3.2  (2.1-4.2)  g/dL


 


Albumin/Globulin Ratio    1.3  (1.0-2.2)  


 


Triglycerides  139   ( - 149) mg/dL


 


Cholesterol  182   ( - 199) mg/dL


 


LDL Cholesterol, Calc  118   ( - 129) mg/dL


 


VLDL Cholesterol  28    mg/dL


 


HDL Cholesterol  36 L   (60 - ) mg/dL


 


LDL/HDL Ratio  3.3    (<4.4)  


 


Cholesterol/HDL Ratio  5.1    (<4.4)  


 


TSH   2.70   (0.34-5.60)  uIU/mL














  06/27/17 06/27/17 06/27/17 Range/Units





  05:05 05:05 01:20 


 


WBC   10.1   (4.8-10.8)  x10^3/uL


 


RBC   4.62   (4.20-5.40)  10^6/uL


 


Hgb   13.5   (12.0-16.0)  g/dL


 


Hct   39.4   (37.0-47.0)  %


 


MCV   85.2   (81.0-99.0)  fL


 


MCH   29.3   (27.0-31.0)  pg


 


MCHC   34.4   (32.0-36.0)  g/dL


 


RDW   14.5   (12.0-15.0)  %


 


Plt Count   196   (130-450)  10^3/uL


 


MPV   8.1   (7.9-10.8)  fL


 


Neut #   6.6   (1.5-6.6)  10^3/uL


 


Lymph #   2.5   (1.5-3.5)  10^3/uL


 


Mono #   0.8   (0.0-1.0)  10^3/uL


 


Eos #   0.1   (0.0-0.7)  10^3/uL


 


Baso #   0.1   (0.0-0.1)  10^3/uL


 


Absolute Nucleated RBC   0.00   x10^3/uL


 


Nucleated RBCs   0.0   /100WBC


 


PT  12.6    (9.9-12.6)  secs


 


INR  1.1    (0.8-1.2)  


 


Sodium     (135-145)  mmol/L


 


Potassium     (3.5-5.0)  mmol/L


 


Chloride     (101-111)  mmol/L


 


Carbon Dioxide     (21-32)  mmol/L


 


Anion Gap     (6-13)  


 


BUN     (6-20)  mg/dL


 


Creatinine     (0.4-1.0)  mg/dL


 


Estimated GFR (MDRD)     (>89)  


 


Glucose     ()  mg/dL


 


Glycated Hemoglobin    8.1 H  (4.6-6.2)  %


 


Estim Average Glucose    186 H  ()  


 


Calcium     (8.5-10.3)  mg/dL


 


Total Bilirubin     (0.2-1.0)  mg/dL


 


AST     (10-42)  IU/L


 


ALT     (10-60)  IU/L


 


Alkaline Phosphatase     ()  IU/L


 


Total Protein     (6.7-8.2)  g/dL


 


Albumin     (3.2-5.5)  g/dL


 


Globulin     (2.1-4.2)  g/dL


 


Albumin/Globulin Ratio     (1.0-2.2)  


 


Triglycerides    ( - 149) mg/dL


 


Cholesterol    ( - 199) mg/dL


 


LDL Cholesterol, Calc    ( - 129) mg/dL


 


VLDL Cholesterol     mg/dL


 


HDL Cholesterol    (60 - ) mg/dL


 


LDL/HDL Ratio     (<4.4)  


 


Cholesterol/HDL Ratio     (<4.4)  


 


TSH     (0.34-5.60)  uIU/mL














- Diagnostic Imaging


Diagnostic Imaging Results: positive: Final report reviewed, Discussed with 

radiologist (MRI brain; scattered acute infacrct 6 hours to 7 days in R frontal 

periventricular WM and right peritrigonal WM.  Distribution corrresponds to R 

MCA and /or R MCA watershed territories. No hemorrhage.  MRA brain, neck  R 

occlusion R MCA beginning at origin; no enhancement distal branches.   PCA Right

; focal high grade stenosis or occlusion distal P1 segment;  Absence of R MCA 

flow void; concerning for occclusion lEFT:  l mca ; PATENT BUT HGH GRADE 

STENOSIS; > 75% LUMINAL HNARROWING, MODERATE HIGH GRADE STENOSIS 50-70% LUMINAL 

NARROWING PROXIMAL m2 BRANCH.  pca lEFT; p1 SEGMENT NOT WELL VISUALIZED ; MOST 

LIKELY HIGH GRADE STENOSIS OR OCCLUSION . p2 SEGMENT 50-70% LUMINAL NARROWING)





Assessment/Plan





- Problem List


(1) Acute ischemic stroke


Impression: 


 and francisco ALSO cardioembolic after 2nd discussion w/ [place] neurologist


contacted by radiologist after MRI (see under objective)


 2 areas of acute stroke, as well as occlusion origin of R MCA, AND significant 

disease L MCAalso significant atherosclerotic disease bilateral PCA's


 carotids < 50% bilaterally


 exam c/w R MCA stroke w/ L neglect and field deficit and L neglect somewhat 

more pronounced this afternoon


 have already started ASA and statin


spoke with neurologist x 2 today


  given progressive symptoms spoke Maur[place] again (? when to repeat CT to ensure 

no hemorrhagic conversion), when to start anticoag


She advised CTA would help her determine better where there is flow (suggested 

that MRA overread the areas of no flow


Radiologist Dr. Costa indicated he would not do with current GFR (this am 

was 39)





 allow permissive HTN (stopped prn hydralazine)


 PT., OT eval


 contacted SLP (Neelima) for eval this afternoon, son indicates she took sip 

water and "it went down wrong way"


SLP advises dysphagia mechanical diet, thins, upright w/ supervision, check for 

pocketing on Left, and empty mouth after meal


will d/w CM in am; likely needs neuro skilled facility





 


 











(3) Atrial fibrillation


Impression: 


12 lead EKG done this am; rate controlled afib


no known prior dx


echo pending today


Qualifiers: 


   Atrial fibrillation type: unspecified   Qualified Code(s): I48.91 - 

Unspecified atrial fibrillation   





(4) Hypertension


Impression: 


as above, permissive hypertension / acute stroke











(5) CKD (chronic kidney disease)


Impression: 


will recheckCr in am following IVF


 likely CKD from chronic untreated DM


(dont know baseline but this is likely close)


Qualifiers: 


   Chronic kidney disease stage: stage 3 (moderate)   Qualified Code(s): N18.3 

- Chronic kidney disease, stage 3 (moderate)   





(6) Urinary tract infection


Impression: 


not likely r/t her presentation


on ceftriaxone


f/u culture


Qualifiers: 


   Urinary tract infection type: acute cystitis   Hematuria presence: without 

hematuria   Qualified Code(s): N30.00 - Acute cystitis without hematuria

## 2017-06-27 NOTE — MRI REPORT
EXAM:

MR ANGIOGRAM NECK WITHOUT CONTRAST

 

EXAM DATE: 6/27/2017 11:03 AM.

 

CLINICAL HISTORY: 83-year-old woman with altered mental status and facial droop. Concern for stroke.

 

COMPARISON: None.

 

TECHNIQUE: Multiplanar, multisequence MRA sequences of the neck were performed. Other: None. Post-pro
cessing: Multiplanar 3D MIP reconstructions. IV Contrast: None.  Evaluation of arterial stenosis is b
ased on a NASCET method of measurement.

 

FINDINGS:

RIGHT

Common and Internal carotid: Patent. No dissection or significant stenosis. Flow-related artifact arcos
its fine evaluation at the bifurcation and distal cervical ICA.

External Carotid: Patent. No dissection or significant stenosis.

Vertebral: Patent. No dissection or significant stenosis. Flow-related artifact limits evaluation of 
the V3 segment.

 

LEFT

Common and Internal carotid: Patent. Apparent narrowing at the proximal ICA may reflect underlying at
herosclerotic disease versus flow-related artifact, but possible narrowing is less than 50%. 

External Carotid: Patent. No dissection or significant stenosis.

Vertebral: Patent. No dissection or significant stenosis. Flow-related artifact limits evaluation of 
the V3 segment.

 

 

Other: Limited evaluation of the neck soft tissues is unremarkable.

 

IMPRESSION: 

1. Carotid and vertebral arteries are patent without significant stenosis or dissection. 

2. Fine evaluation is limited on this noncontrast exam due to flow-related artifact.

 

[facility]

Referring Provider Line: [phone]

 

SITE ID: [id number]

## 2017-06-27 NOTE — MRI REPORT
EXAM

MRA BRAIN

 

EXAM DATE: 6/27/2017 10:39 AM.

 

CLINICAL HISTORY: 83-year-old woman with facial droop and altered mental status. Concern for stroke.

 

COMPARISON: None.

 

TECHNIQUE: Multiplanar, multisequence MRA sequences of the brain were performed. Other: None. Post-pr
ocessing: Multiplanar 3D MIP reconstructions. IV Contrast: None.

 

FINDINGS:

RIGHT:

Visualized Internal Carotid Artery: Patent without significant stenosis. No evidence of aneurysm.

 

Anterior Cerebral Artery: Patent without significant stenosis or aneurysm. 

Middle Cerebral Artery: There is occlusion of the right MCA beginning at the origin. No enhancement i
s visualized in the distal branches. 

Posterior Cerebral Artery: Focal high-grade stenosis or occlusion is present in the distal P1 segment
. The P2 segment is incompletely visualized, consistent with occlusion or high-grade stenosis. There 
is reconstitution of distal branches. 

Posterior Communicating Artery: Patent. No aneurysm.

 

Visualized Vertebral Artery: Patent without significant stenosis. No evidence of dissection or aneury
sm. 

 

LEFT:

Visualized Internal Carotid Artery: Patent without significant stenosis. No evidence of aneurysm.

 

Anterior Cerebral Artery: Patent without significant stenosis or aneurysm. 

Middle Cerebral Artery: Patent, but there is high-grade stenosis (greater than 75% luminal narrowing)
 of the proximal frontal M2 branch and moderate high-grade stenosis (50-70% luminal narrowing) of the
 proximal temporal M2 branch. No injures. 

Posterior Cerebral Artery: The P1 segment is not well-visualized, most likely reflecting high-grade s
tenosis or occlusion given the absence of posterior communicate artery. There is irregularity of the 
P2 segment with 50-70% luminal narrowing. Distal branches are visualized, but smaller caliber than on
 the right. 

Posterior Communicating Artery: Not well visualized.

 

Visualized Vertebral Artery: Patent without significant stenosis. No evidence of dissection or aneury
sm. 

 

CENTRAL:

Anterior Communicating Artery: Patent. No aneurysm.

Basilar: Patent without significant stenosis, dissection, or aneurysm. 

 

IMPRESSION: 

1. 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 No reconstitution is visualized. 

2. High-grade stenosis (greater than 75% luminal narrowing) and moderate to high-grade stenosis (50-7
0% luminal narrowing) of the left MCA frontal and temporal M2 trunks, respectively. 

3. High-grade stenosis or occlusion of the PCAs bilaterally with reconstitution of distal branches, r
ight greater than left.

 

CRITICAL RESULT: The findings were discussed with Dr. Riggins at 11:50 on 06/27/2017.

 

[facility]

Referring Provider Line: [phone]

 

SITE ID: [id number]

## 2017-06-27 NOTE — MRI PRELIMINARY REPORT
Accession: [id number]

Exam: MRI Angio Neck W/O (MRA)

 

IMPRESSION: 

1. Carotid and vertebral arteries are patent without significant stenosis or dissection. 

2. Fine evaluation is limited on this noncontrast exam due to flow-related artifact.

 

[facility]

 

SITE ID: [id number]

## 2017-06-28 LAB
ALBUMIN/GLOB SERPL: 1.3 {RATIO} (ref 1–2.2)
ANION GAP SERPL CALCULATED.4IONS-SCNC: 10 MMOL/L (ref 6–13)
BASOPHILS NFR BLD AUTO: 0.1 10^3/UL (ref 0–0.1)
BASOPHILS NFR BLD AUTO: 0.7 %
BILIRUB BLD-MCNC: 1 MG/DL (ref 0.2–1)
BUN SERPL-MCNC: 17 MG/DL (ref 6–20)
CALCIUM UR-MCNC: 9 MG/DL (ref 8.5–10.3)
CHLORIDE SERPL-SCNC: 103 MMOL/L (ref 101–111)
CO2 SERPL-SCNC: 25 MMOL/L (ref 21–32)
CREAT SERPLBLD-SCNC: 1.3 MG/DL (ref 0.4–1)
EOSINOPHIL # BLD AUTO: 0.3 10^3/UL (ref 0–0.7)
EOSINOPHIL NFR BLD AUTO: 2.5 %
ERYTHROCYTE [DISTWIDTH] IN BLOOD BY AUTOMATED COUNT: 14.7 % (ref 12–15)
GFRSERPLBLD MDRD-ARVRAT: 39 ML/MIN/{1.73_M2} (ref 89–?)
GLOBULIN SER-MCNC: 3 G/DL (ref 2.1–4.2)
GLUCOSE SERPL-MCNC: 163 MG/DL (ref 70–100)
HCT VFR BLD AUTO: 39.6 % (ref 37–47)
HGB UR QL STRIP: 13.6 G/DL (ref 12–16)
LYMPHOCYTES # SPEC AUTO: 2.9 10^3/UL (ref 1.5–3.5)
LYMPHOCYTES NFR BLD AUTO: 26.5 %
MCH RBC QN AUTO: 29.5 PG (ref 27–31)
MCHC RBC AUTO-ENTMCNC: 34.4 G/DL (ref 32–36)
MCV RBC AUTO: 85.9 FL (ref 81–99)
MONOCYTES # BLD AUTO: 0.8 10^3/UL (ref 0–1)
MONOCYTES NFR BLD AUTO: 7.8 %
NEUTROPHILS # BLD AUTO: 6.8 10^3/UL (ref 1.5–6.6)
NEUTROPHILS # SNV AUTO: 10.9 X10^3/UL (ref 4.8–10.8)
NEUTROPHILS NFR BLD AUTO: 62.5 %
NRBC # BLD AUTO: 0 /100WBC
PDW BLD AUTO: 8.1 FL (ref 7.9–10.8)
POTASSIUM SERPL-SCNC: 3.7 MMOL/L (ref 3.5–5)
PROT SPEC-MCNC: 7 G/DL (ref 6.7–8.2)
RBC MAR: 4.61 10^6/UL (ref 4.2–5.4)
SODIUM SERPLBLD-SCNC: 138 MMOL/L (ref 135–145)
WBC # BLD: 10.9 X10^3/UL

## 2017-06-28 RX ADMIN — Medication SCH MG: at 09:31

## 2017-06-28 RX ADMIN — INSULIN ASPART SCH UNIT: 100 INJECTION, SOLUTION INTRAVENOUS; SUBCUTANEOUS at 18:54

## 2017-06-28 RX ADMIN — SODIUM CHLORIDE, PRESERVATIVE FREE SCH ML: 5 INJECTION INTRAVENOUS at 21:23

## 2017-06-28 RX ADMIN — INSULIN ASPART SCH UNIT: 100 INJECTION, SOLUTION INTRAVENOUS; SUBCUTANEOUS at 12:19

## 2017-06-28 RX ADMIN — SODIUM CHLORIDE, PRESERVATIVE FREE SCH ML: 5 INJECTION INTRAVENOUS at 13:51

## 2017-06-28 RX ADMIN — ACETAMINOPHEN PRN MG: 325 TABLET ORAL at 11:19

## 2017-06-28 RX ADMIN — SODIUM CHLORIDE, PRESERVATIVE FREE SCH: 5 INJECTION INTRAVENOUS at 05:47

## 2017-06-28 RX ADMIN — ATORVASTATIN CALCIUM SCH MG: 40 TABLET, FILM COATED ORAL at 21:22

## 2017-06-28 RX ADMIN — PHENAZOPYRIDINE SCH MG: 100 TABLET ORAL at 05:46

## 2017-06-28 RX ADMIN — SODIUM CHLORIDE SCH MLS/HR: 9 INJECTION, SOLUTION INTRAVENOUS at 01:06

## 2017-06-28 RX ADMIN — ENOXAPARIN SODIUM SCH MG: 100 INJECTION SUBCUTANEOUS at 09:31

## 2017-06-28 RX ADMIN — Medication SCH MG: at 18:55

## 2017-06-28 RX ADMIN — INSULIN GLARGINE SCH UNIT: 100 INJECTION, SOLUTION SUBCUTANEOUS at 21:23

## 2017-06-28 RX ADMIN — INSULIN ASPART SCH UNIT: 100 INJECTION, SOLUTION INTRAVENOUS; SUBCUTANEOUS at 21:23

## 2017-06-28 RX ADMIN — PHENAZOPYRIDINE SCH MG: 100 TABLET ORAL at 13:50

## 2017-06-28 RX ADMIN — POLYETHYLENE GLYCOL 3350 SCH GM: 17 POWDER, FOR SOLUTION ORAL at 09:31

## 2017-06-28 RX ADMIN — ACETAMINOPHEN PRN MG: 325 TABLET ORAL at 22:01

## 2017-06-28 RX ADMIN — LISINOPRIL SCH MG: 5 TABLET ORAL at 11:19

## 2017-06-28 RX ADMIN — ASPIRIN SCH MG: 81 TABLET, CHEWABLE ORAL at 09:31

## 2017-06-28 RX ADMIN — INSULIN ASPART SCH UNIT: 100 INJECTION, SOLUTION INTRAVENOUS; SUBCUTANEOUS at 09:29

## 2017-06-28 NOTE — CT PRELIMINARY REPORT
Accession: [id number]

Exam: CT Head W/O

 

IMPRESSION: 

1. Increasing extent of the nonhemorrhagic vascular insults involving the right middle cerebral arter
y territory. 

2. No intracranial blood products.

 

[facility]

 

The above critical findings  were discussed with provider . Mikala Butler by Dr. Julianne land 17:09 hrs on 6/28/17.

 

SITE ID: [id number]

## 2017-06-28 NOTE — CT REPORT
EXAM:

CT HEAD

 

EXAM DATE: 6/28/2017 04:51 PM.

 

CLINICAL HISTORY: Altered mental status, fever. Atrial fibrillation. Facial droop. Multiple infarcts 
scattered throughout the right middle cerebral artery territory. Anticoagulation planning.

 

COMPARISON: 06/26/2017. Head MRI earlier today at 1050.

 

TECHNIQUE: Multiaxial CT images were obtained from the foramen magnum to the vertex. IV contrast: Non
e. Reformats: Coronal.

 

In accordance with CT protocol optimization, one or more of the following dose reduction techniques w
ere utilized for this exam: automated exposure control, adjustment of mA and/or KV based on patient s
ize, or use of iterative reconstructive technique.

 

FINDINGS:

Parenchyma: 

No intra-axial blood products.

Interval enlargement and new confluence of the acute nonhemorrhagic vascular insults scattered throug
hout the right middle cerebral artery, with the greatest degree of involvement now the distal half of
 the right temporal lobe and the superior mid centrum semi-ovale. There is effacement of the involved
 sulci.

 

Extraaxial Spaces: Mild to moderate cerebral atrophy. No extra-axial blood products.

 

Ventricles: Minimal effacement of the right lateral ventricle. No midline shift nor intraventricular 
blood products. Ambient and quadrigeminal cisterns normal caliber.

 

Sinuses: Imaged paranasal sinuses, orbits, and mastoids show no significant abnormality.

 

Bones: No evidence of fracture or calvarial defect.

 

Other: Diffuse chronic microangiopathic white matter changes are evident.

 

IMPRESSION: 

1. Increasing extent of the nonhemorrhagic vascular insults involving the right middle cerebral arter
y territory. 

2. No intracranial blood products.

 

[facility]

 

The above critical findings  were discussed with provider Dr. Mikala Butler by Dr. Julianne Verdugo
 at 17:09 hrs on 6/28/17.

Referring Provider Line: [phone]

 

SITE ID: [id number]

## 2017-06-28 NOTE — PROVIDER PROGRESS NOTE
Subjective





- Prog Note Date


Prog Note Date: 06/28/17


Prog Note Time: 10:29 (late entry; seen 7:30am)





- Subjective


Subjective: 





Family thinks her color looks better today


 "here because I fell"


later in evening ~ 5:30pm; "here because my sons were worried that there was 

something wrong with me"


I've heard it said that I had a stroke"


"I feel lethargic" (when asked if tired)


identifies both sons, daughter in law





Current Medications





- Current Medications


Current Medications: 








Active Medications











Generic Name Dose Route Start Last Admin





  Trade Name Freq  PRN Reason Stop Dose Admin


 


Acetaminophen  650 mg 06/27/17 01:10  





  Tylenol  PO   





  Q4HR PRN   





  Pain 1 to 4   


 


Aspirin  81 mg 06/27/17 13:00 06/28/17 09:31





  [place] Aspirin  PO   81 mg





  DAILY RAYNA   Administration


 


Atorvastatin Calcium  80 mg 06/27/17 21:00 06/27/17 21:42





  Lipitor  PO   80 mg





  QPM RAYNA   Administration


 


Enoxaparin Sodium  40 mg 06/27/17 09:00 06/28/17 09:31





  Lovenox  SUBQ   40 mg





  DAILY RAYNA   Administration


 


Sodium Chloride  1,000 mls @ 100 mls/hr 06/27/17 02:00 06/28/17 01:06





  Normal Saline 0.9%  IV   100 mls/hr





  .Q10H RAYNA   Administration


 


Ceftriaxone Sodium 1 gm/  100 mls @ 200 mls/hr 06/27/17 18:00 06/27/17 18:07





  Sodium Chloride  IV   200 mls/hr





  Q24H RAYNA   Administration


 


Insulin Aspart  1 - 5 unit 06/27/17 08:00 06/28/17 09:29





  Novolog  SUBQ   1 unit





  0800,1200,1700,2100 RAYNA   Administration





  Protocol   


 


Insulin Glargine  8 unit 06/28/17 21:00  





  Lantus Solostar  SUBQ   





  QPM RAYNA   


 


Lisinopril  5 mg 06/28/17 10:00  





  Zestril  PO   





  DAILY RAYNA   


 


Ondansetron HCl  4 mg 06/27/17 01:10  





  Zofran Inj  IVP   





  Q6HR PRN   





  Nausea / Vomiting   


 


Oxycodone HCl  5 mg 06/27/17 01:10 06/27/17 23:59





  Roxicodone  PO   5 mg





  Q4HR PRN   Administration





  Pain 5 to 7   


 


Phenazopyridine HCl  100 mg 06/27/17 22:00 06/28/17 05:46





  Pyridium  PO   100 mg





  TID RAYNA   Administration


 


Polyethylene Glycol  17 gm 06/27/17 09:00 06/28/17 09:31





  Miralax  PO   17 gm





  DAILY RAYNA   Administration


 


Saccharomyces Boulardii  250 mg 06/27/17 08:00 06/28/17 09:31





  Florastor  PO   250 mg





  BIDWM RAYNA   Administration


 


Sodium Chloride  10 ml 06/27/17 01:10  





  Normal Saline Flush 0.9%  IVP   





  PRN PRN   





  AS NEEDED PER PROVIDER ORDERS   


 


Sodium Chloride  10 ml 06/27/17 06:00 06/28/17 05:47





  Normal Saline Flush 0.9%  IVP   Not Given





  Q8HR RAYNA   








 





Naproxen Sodium [Aleve] 220 mg PO BID PRN 06/27/17 











Objective





- Vital Signs/Intake & Output


Reviewed Vital Signs: Yes


Vital Signs: 





 Vital Signs x48h











  Temp Pulse Resp BP BP Pulse Ox


 


 06/28/17 08:59  36.3 C L  57 L  12   169/77 H  96


 


 06/28/17 05:38     162/80 H  171/60 H 


 


 06/28/17 05:29  36.6 C  56 L  18   210/80 H  96











Intake & Output: 





 Intake & Output











 06/25/17 06/26/17 06/27/17 06/28/17





 23:59 23:59 23:59 23:59


 


Intake Total   1870 240


 


Balance   1870 240














- Objective


General Appearance: positive: No acute distress, Other (seen in bed this am; 

more alert, her gaze actually includes the left)


Respiratory: positive: Chest non-tender, No respiratory distress, Breath sounds 

nml


Cardiovascular: positive: Irregularly irregular (rate ~ 60 (irregularly 

irregular) ; at this rate sounds more like extrasystoles, but afib on tele no 

tachy/italia dysrhtymia).  negative: Systolic murmur


Abdomen: positive: Nml bowel sounds, No distention, Other (assisted up to 

commode in evening pyridium stained urine).  negative: Tenderness


Back: negative: CVA tenderness (R), CVA tenderness (L)


Skin: positive: Warm, Dry


Extremities: positive: No pedal edema


Neurologic/Psychiatric: positive: Other (oriented to self, son, doesnt remember 

me (or doesnt say she does),  (as under S; later in evening able to state 

better why she is here)   pupils =, round react to light , no assymetry, 2mm, 

EOMI, nystagumus only at extremes, slight L facialdroop.  L hand 3/5, LLE today 

4+/5 when in bed (w/ several requests did lift LLE off bed, and plantar / dorsi 

flexion 4+/5 on L.  Her field deficit seems somewhat improved (at least tis 

morning) as her gaze was freely incluidng the left.   listing to left when 

sitting up. complained of HA, no pupilary or vision changes. cant do rapid 

alternating movements w/ LUE;  (2/5 strenght on LUE))





- Lab Results


Fish Bones: 


 06/28/17 05:30





 06/28/17 05:30


Other Labs: 





 Lab Results x24hrs











  06/28/17 06/28/17 06/28/17 Range/Units





  08:16 05:30 05:30 


 


WBC    10.9 H  (4.8-10.8)  x10^3/uL


 


RBC    4.61  (4.20-5.40)  10^6/uL


 


Hgb    13.6  (12.0-16.0)  g/dL


 


Hct    39.6  (37.0-47.0)  %


 


MCV    85.9  (81.0-99.0)  fL


 


MCH    29.5  (27.0-31.0)  pg


 


MCHC    34.4  (32.0-36.0)  g/dL


 


RDW    14.7  (12.0-15.0)  %


 


Plt Count    193  (130-450)  10^3/uL


 


MPV    8.1  (7.9-10.8)  fL


 


Neut #    6.8 H  (1.5-6.6)  10^3/uL


 


Lymph #    2.9  (1.5-3.5)  10^3/uL


 


Mono #    0.8  (0.0-1.0)  10^3/uL


 


Eos #    0.3  (0.0-0.7)  10^3/uL


 


Baso #    0.1  (0.0-0.1)  10^3/uL


 


Absolute Nucleated RBC    0.00  x10^3/uL


 


Nucleated RBCs    0.0  /100WBC


 


Sodium   138   (135-145)  mmol/L


 


Potassium   3.7   (3.5-5.0)  mmol/L


 


Chloride   103   (101-111)  mmol/L


 


Carbon Dioxide   25   (21-32)  mmol/L


 


Anion Gap   10.0   (6-13)  


 


BUN   17   (6-20)  mg/dL


 


Creatinine   1.3 H   (0.4-1.0)  mg/dL


 


Estimated GFR (MDRD)   39 L   (>89)  


 


Glucose   163 H   ()  mg/dL


 


POC Whole Bld Glucose  173 H    (70 - 100)  mg/dL


 


Calcium   9.0   (8.5-10.3)  mg/dL


 


Total Bilirubin   1.0   (0.2-1.0)  mg/dL


 


AST   24   (10-42)  IU/L


 


ALT   16   (10-60)  IU/L


 


Alkaline Phosphatase   65   ()  IU/L


 


Total Protein   7.0   (6.7-8.2)  g/dL


 


Albumin   4.0   (3.2-5.5)  g/dL


 


Globulin   3.0   (2.1-4.2)  g/dL


 


Albumin/Globulin Ratio   1.3   (1.0-2.2)  














  06/27/17 06/27/17 Range/Units





  18:15 11:39 


 


WBC    (4.8-10.8)  x10^3/uL


 


RBC    (4.20-5.40)  10^6/uL


 


Hgb    (12.0-16.0)  g/dL


 


Hct    (37.0-47.0)  %


 


MCV    (81.0-99.0)  fL


 


MCH    (27.0-31.0)  pg


 


MCHC    (32.0-36.0)  g/dL


 


RDW    (12.0-15.0)  %


 


Plt Count    (130-450)  10^3/uL


 


MPV    (7.9-10.8)  fL


 


Neut #    (1.5-6.6)  10^3/uL


 


Lymph #    (1.5-3.5)  10^3/uL


 


Mono #    (0.0-1.0)  10^3/uL


 


Eos #    (0.0-0.7)  10^3/uL


 


Baso #    (0.0-0.1)  10^3/uL


 


Absolute Nucleated RBC    x10^3/uL


 


Nucleated RBCs    /100WBC


 


Sodium  135   (135-145)  mmol/L


 


Potassium  3.9   (3.5-5.0)  mmol/L


 


Chloride  101   (101-111)  mmol/L


 


Carbon Dioxide  23   (21-32)  mmol/L


 


Anion Gap  11.0   (6-13)  


 


BUN  18   (6-20)  mg/dL


 


Creatinine  1.5 H   (0.4-1.0)  mg/dL


 


Estimated GFR (MDRD)  33 L   (>89)  


 


Glucose  206 H   ()  mg/dL


 


POC Whole Bld Glucose   206 H  (70 - 100)  mg/dL


 


Calcium  8.9   (8.5-10.3)  mg/dL


 


Total Bilirubin    (0.2-1.0)  mg/dL


 


AST    (10-42)  IU/L


 


ALT    (10-60)  IU/L


 


Alkaline Phosphatase    ()  IU/L


 


Total Protein    (6.7-8.2)  g/dL


 


Albumin    (3.2-5.5)  g/dL


 


Globulin    (2.1-4.2)  g/dL


 


Albumin/Globulin Ratio    (1.0-2.2)  














Assessment/Plan





- Problem List


(1) Acute ischemic stroke


Impression: 


Acute ischemic stroke


Impression: 


6/28:    R MCA stroke w/ multiple areas of stenosis as previously noted; 

Symptoms slightly improved this am as under O and remain stable (if not 

slightly improved late in day re: alertness, vision change):  spoke w/ 

neurology at [facility] again this am (aware no CTA last safia)


 Would not start anticoagulation until SBP under >/=160, BUT, she has concerns 

that as we start to lower the BP that given all the areas of stenosis that she 

might have recurrent deficits if she has been "living with" a compensatory high 

blood pressure.


  Plan; start low dose lisinopril today (DM2) ; 


        -monitor for any recurrent deficit (briefly 133/74 ~ midnight w/ no 

reported deficit; 


       -start anticoagulation for afib once BP stable ~ 160, and CT to r/o 

bleed before the xarelto


   - Re permissive HTN recomendations [facility] neurologist indicated she and 

colleagues "split the difference of recommendations" of 185 vs 220) with max 

200 for permissive hypertension in first 48 hrs


-repeat PT, OT, speech/swallow (w/ recs as previously ordered) eval today


-likely neuro SNF (or per therapy recs) in a few days once clearly tolerating 

BP meds





Addendum: 6p; repeated head CT since SBP closer to goal (169, 153/77 current bp 

(6:40, alert, awake, eating (w/ assist as noted)


 191 was ~ time of transfer to CT, and needed to use commode


 Radiologist called that R MCA territory infarct "doubled in volume sine this 

morning" w/ edema(the MRI was 6/27 am, and clinically stroke was evolving 

yesterday as noted)  NO bleed


  d/w Dr. Kathy Marques at [facility] again; She indicated continue current plan and 

redisucss tomorrow  if clinically she is changed w/ the addition of BP meds.  

All day , she is neurologically/ clinically stable, with exam if anything 

slighly improved


I discussed w/ son Darrell ~ 25 minutes this pm, and nolvia camacho ~ 15 minutes this am





6/27


 and francisco ALSO cardioembolic after 2nd discussion w/ [facility] neurologist, TTE

, no thrombus ID'd (no DANA here)


contacted by radiologist after MRI (see under objective)


 2 areas of acute stroke, as well as occlusion origin of R MCA, AND significant 

disease L MCAalso significant atherosclerotic disease bilateral PCA's


 carotids < 50% bilaterally


 exam c/w R MCA stroke w/ L neglect and field deficit and L neglect somewhat 

more pronounced this afternoon


 have already started ASA and statin


spoke with neurologist x 2 today


  given progressive symptoms spoke [facility] again (? reCT to ensure no 

hemorrhagic conversion), when to start anticoag


She advised CTA would help her determine better where there is flow (suggested 

that MRA overread the areas of no flow


Radiologist Dr. Costa indicated he would not do with current GFR (this am 

was 39)








(3) Atrial fibrillation


Impression: 


rate controlled (no room to add BB or nondihydropiridineCCB)


 as above once BP controlled SBP ~ 160 start anticoagulation (will use xarelto) 

and redo head CT before starting (ensure no element of hemorrhage before 

starting anticoag


 neurologist at [facility]


 Echo normal EF, no WMA, TSH normal


  


(5) CKD (chronic kidney disease)


did not go to PCP but rarely


baseline not known but suspect it is 1.3-1.5 based on little change w/ IVF


starting low dose ACEI for BP control, will monitor for hyperkalemia or 

worsening Cr on the addition of ACEI . IF worse Cr, will change to amlodipine





(6) Urinary tract infection


Impression: 


not likely r/t her presentation now that we know had stroke


 alpha heme strep


day 3 of ceftriaxone today


since was not having symptoms (we know presenting s/s were stroke , not UTI); 3 

days adequate tx


 


Head ache: no pupil changes,no change in neuro exam c/w this am


 tylenol


d/c oxycodone to not cloud neuro exam











(3) Atrial fibrillation


Qualifiers: 


   Atrial fibrillation type: unspecified   Qualified Code(s): I48.91 - 

Unspecified atrial fibrillation   








(5) CKD (chronic kidney disease)


Qualifiers: 


   Chronic kidney disease stage: stage 3 (moderate)   Qualified Code(s): N18.3 

- Chronic kidney disease, stage 3 (moderate)   





(6) Urinary tract infection


Qualifiers: 


   Urinary tract infection type: acute cystitis   Hematuria presence: without 

hematuria   Qualified Code(s): N30.00 - Acute cystitis without hematuria

## 2017-06-29 LAB
ANION GAP SERPL CALCULATED.4IONS-SCNC: 9 MMOL/L (ref 6–13)
BASOPHILS NFR BLD AUTO: 0.1 10^3/UL (ref 0–0.1)
BASOPHILS NFR BLD AUTO: 0.9 %
BUN SERPL-MCNC: 19 MG/DL (ref 6–20)
CALCIUM UR-MCNC: 9.1 MG/DL (ref 8.5–10.3)
CHLORIDE SERPL-SCNC: 101 MMOL/L (ref 101–111)
CO2 SERPL-SCNC: 29 MMOL/L (ref 21–32)
CREAT SERPLBLD-SCNC: 1.3 MG/DL (ref 0.4–1)
EOSINOPHIL # BLD AUTO: 0.3 10^3/UL (ref 0–0.7)
EOSINOPHIL NFR BLD AUTO: 3.4 %
ERYTHROCYTE [DISTWIDTH] IN BLOOD BY AUTOMATED COUNT: 14.8 % (ref 12–15)
GFRSERPLBLD MDRD-ARVRAT: 39 ML/MIN/{1.73_M2} (ref 89–?)
GLUCOSE SERPL-MCNC: 162 MG/DL (ref 70–100)
HCT VFR BLD AUTO: 39.2 % (ref 37–47)
HGB UR QL STRIP: 13.3 G/DL (ref 12–16)
LYMPHOCYTES # SPEC AUTO: 2.5 10^3/UL (ref 1.5–3.5)
LYMPHOCYTES NFR BLD AUTO: 24.7 %
MCH RBC QN AUTO: 29.2 PG (ref 27–31)
MCHC RBC AUTO-ENTMCNC: 33.9 G/DL (ref 32–36)
MCV RBC AUTO: 86 FL (ref 81–99)
MONOCYTES # BLD AUTO: 0.8 10^3/UL (ref 0–1)
MONOCYTES NFR BLD AUTO: 8.1 %
NEUTROPHILS # BLD AUTO: 6.3 10^3/UL (ref 1.5–6.6)
NEUTROPHILS # SNV AUTO: 10.1 X10^3/UL (ref 4.8–10.8)
NEUTROPHILS NFR BLD AUTO: 62.9 %
NRBC # BLD AUTO: 0.1 /100WBC
PDW BLD AUTO: 7.9 FL (ref 7.9–10.8)
POTASSIUM SERPL-SCNC: 3.8 MMOL/L (ref 3.5–5)
RBC MAR: 4.56 10^6/UL (ref 4.2–5.4)
SODIUM SERPLBLD-SCNC: 139 MMOL/L (ref 135–145)
WBC # BLD: 10.1 X10^3/UL

## 2017-06-29 RX ADMIN — SODIUM CHLORIDE SCH MLS/HR: 9 INJECTION, SOLUTION INTRAVENOUS at 19:30

## 2017-06-29 RX ADMIN — CEPHALEXIN SCH MG: 250 CAPSULE ORAL at 21:59

## 2017-06-29 RX ADMIN — SODIUM CHLORIDE, PRESERVATIVE FREE SCH: 5 INJECTION INTRAVENOUS at 21:58

## 2017-06-29 RX ADMIN — INSULIN GLARGINE SCH UNIT: 100 INJECTION, SOLUTION SUBCUTANEOUS at 22:14

## 2017-06-29 RX ADMIN — SODIUM CHLORIDE SCH MLS/HR: 9 INJECTION, SOLUTION INTRAVENOUS at 23:51

## 2017-06-29 RX ADMIN — SODIUM CHLORIDE SCH MLS/HR: 9 INJECTION, SOLUTION INTRAVENOUS at 21:31

## 2017-06-29 RX ADMIN — ASPIRIN SCH MG: 81 TABLET, CHEWABLE ORAL at 08:21

## 2017-06-29 RX ADMIN — ENOXAPARIN SODIUM SCH MG: 100 INJECTION SUBCUTANEOUS at 08:21

## 2017-06-29 RX ADMIN — INSULIN ASPART SCH UNIT: 100 INJECTION, SOLUTION INTRAVENOUS; SUBCUTANEOUS at 22:13

## 2017-06-29 RX ADMIN — ATORVASTATIN CALCIUM SCH MG: 40 TABLET, FILM COATED ORAL at 21:58

## 2017-06-29 RX ADMIN — Medication SCH MG: at 08:21

## 2017-06-29 RX ADMIN — INSULIN ASPART SCH UNIT: 100 INJECTION, SOLUTION INTRAVENOUS; SUBCUTANEOUS at 17:13

## 2017-06-29 RX ADMIN — POLYETHYLENE GLYCOL 3350 SCH GM: 17 POWDER, FOR SOLUTION ORAL at 08:22

## 2017-06-29 RX ADMIN — SODIUM CHLORIDE, PRESERVATIVE FREE SCH ML: 5 INJECTION INTRAVENOUS at 05:30

## 2017-06-29 RX ADMIN — SODIUM CHLORIDE, PRESERVATIVE FREE SCH ML: 5 INJECTION INTRAVENOUS at 13:38

## 2017-06-29 RX ADMIN — Medication SCH MG: at 17:13

## 2017-06-29 RX ADMIN — INSULIN ASPART SCH UNIT: 100 INJECTION, SOLUTION INTRAVENOUS; SUBCUTANEOUS at 08:20

## 2017-06-29 RX ADMIN — ACETAMINOPHEN PRN MG: 325 TABLET ORAL at 13:38

## 2017-06-29 RX ADMIN — INSULIN ASPART SCH UNIT: 100 INJECTION, SOLUTION INTRAVENOUS; SUBCUTANEOUS at 12:04

## 2017-06-29 RX ADMIN — LISINOPRIL SCH MG: 5 TABLET ORAL at 08:21

## 2017-06-29 RX ADMIN — CEPHALEXIN SCH MG: 250 CAPSULE ORAL at 13:38

## 2017-06-29 RX ADMIN — ACETAMINOPHEN PRN MG: 325 TABLET ORAL at 02:27

## 2017-06-29 NOTE — CT REPORT
EXAM:

CT HEAD

 

EXAM DATE: 6/29/2017 08:10 PM.

 

CLINICAL HISTORY: Acute right middle cerebral artery territory infarct.

 

COMPARISON: 06/28/2017.

 

TECHNIQUE: Multiaxial CT images were obtained from the foramen magnum to the vertex. IV contrast: Non
e. Reformats: Coronal.

 

In accordance with CT protocol optimization, one or more of the following dose reduction techniques w
ere utilized for this exam: automated exposure control, adjustment of mA and/or KV based on patient s
ize, or use of iterative reconstructive technique.

 

FINDINGS:

Parenchyma: 

No intraaxial blood products.

Mild further interval increase in the overall caliber and degree of confluence of the patchy decrease
d densities throughout the right middle cerebral artery territory, now involving the entirety of the 
right temporal lobe. Further increase in the degree of mild mass effect with effacement of the involv
ed sulci and right sylvian fissure.

 

Extraaxial Spaces: Normal for age. No subdural or epidural collections identified.

 

Ventricles: Stable slight effacement right lateral ventricle. No intraventricular blood products nor 
midline shift. Ambient and quadrigeminal cisterns normal caliber.

 

Sinuses: Imaged paranasal sinuses, orbits, and mastoids show no significant abnormality.

 

Bones: No evidence of fracture or calvarial defect.

 

Other: Diffuse chronic microangiopathic white matter changes are evident.

 

IMPRESSION: Continued mild increase in the conspicuity and overall extent of the nonhemorrhagic vascu
lar insult(s) involving the right middle cerebral artery territory.

 

[facility]

Referring Provider Line: [phone]

 

SITE ID: [id number]

## 2017-06-29 NOTE — CT PRELIMINARY REPORT
Accession: [id number]

Exam: CT Head W/O

 

IMPRESSION: Continued mild increase in the conspicuity and overall extent of the nonhemorrhagic vascu
lar insult(s) involving the right middle cerebral artery territory.

 

[facility]

 

SITE ID: [id number]

## 2017-06-29 NOTE — PROVIDER PROGRESS NOTE
Subjective





- Prog Note Date


Prog Note Date: 06/29/17


Prog Note Time: 07:00





- Subjective


Pt reports feeling: No change (says she slept, headache resolved "here because 

I fell"   (reminded of stroke) I had endometriosis and had a hysterectomy (when 

asked to remind me what the verticle scar on her abdomen is from))


Subjective: 





Addendum; 7pm, RN called me that patient was clammy and her SBP lower than had 

been ~ 112 (had been 150's.  HR mid 40's (has been afib with slow ventricular 

response since here, and as noted cardiology today felt likely excess vagal 

tone in setting of stroke


(of note this am, there was a BP documented as 127 with no change in neuro 

status (as noted BP checked several x this morning w/-169; which are not 

entered on her VS flow


She had been sitting up (upright bed position) for dinner without problem (

needing supervision to not eat to fast)


Patient clammy, arousable, "I feel a little dizzy" ,  LUE still flacid, pupils 

equal, still folllows EOM's. Gave fluid bolus 250cc  x 2. AFter 2nd bolus SBP 

149 and patient more arousable; follows commands Was able reiterate the plan 

"going for a cat scan" 


LLE ~ 2/5 strength, does not raise it off bed other than a few cm.  (when 

sitting up in chair was able to hip flex)


 R side strength stable at baseline


Spoke with neurologist at [facility], (DR. Marques already signed out); had d/w her 

this am starting xarelto, and BP stable on the 5 mg lisinpril started yesterday 

with no worsening in neurologic status


  He agreed w/ volume bolus, continued concern for the possiblity of 

hemorrhagic conversion, agreed the bradycardia (56 when awake possilby excess 

vagal tone)


 Sent for stat noncontrast head CT ; result pending


 for now lilsinopril and anticoag (which arent due til tomorrow) d/c'd til 

result


AT this time she arouses easily, but falls asleep easiland responds to 

questions w/ neur exam similar to earlier in day (but with LLE weaker (compared 

to exam up in chair)


Asked again re: Code status;, "stsill wants to be resuscitated. When asked what 

that meant to her, she said, "you would have to do what you needed to do to get 

it started again (heart), she does still want Full code (w/ son as DPOA мария)


I contacted Joaquim 








Current Medications





- Current Medications


Current Medications: 








Active Medications











Generic Name Dose Route Start Last Admin





  Trade Name Freq  PRN Reason Stop Dose Admin


 


Acetaminophen  650 mg 06/27/17 01:10 06/29/17 02:27





  Tylenol  PO   650 mg





  Q4HR PRN   Administration





  Pain 1 to 4   


 


Aspirin  81 mg 06/27/17 13:00 06/28/17 09:31





  [place] Aspirin  PO   81 mg





  DAILY RAYNA   Administration


 


Atorvastatin Calcium  80 mg 06/27/17 21:00 06/28/17 21:22





  Lipitor  PO   80 mg





  QPM RAYNA   Administration


 


Enoxaparin Sodium  40 mg 06/27/17 09:00 06/28/17 09:31





  Lovenox  SUBQ   40 mg





  DAILY RAYNA   Administration


 


Insulin Aspart  1 - 9 unit 06/29/17 08:00  





  Novolog  SUBQ   





  0800,1200,1700,2100 RAYNA   





  Protocol   


 


Insulin Glargine  8 unit 06/28/17 21:00 06/28/17 21:23





  Lantus Solostar  SUBQ   8 unit





  QPM RAYNA   Administration


 


Lisinopril  5 mg 06/28/17 10:00 06/28/17 11:19





  Zestril  PO   5 mg





  DAILY RAYNA   Administration


 


Ondansetron HCl  4 mg 06/27/17 01:10  





  Zofran Inj  IVP   





  Q6HR PRN   





  Nausea / Vomiting   


 


Polyethylene Glycol  17 gm 06/27/17 09:00 06/28/17 09:31





  Miralax  PO   17 gm





  DAILY RAYNA   Administration


 


Saccharomyces Boulardii  250 mg 06/27/17 08:00 06/28/17 18:55





  Florastor  PO   250 mg





  BIDWM RAYNA   Administration


 


Sodium Chloride  10 ml 06/27/17 01:10  





  Normal Saline Flush 0.9%  IVP   





  PRN PRN   





  AS NEEDED PER PROVIDER ORDERS   


 


Sodium Chloride  10 ml 06/27/17 06:00 06/29/17 05:30





  Normal Saline Flush 0.9%  IVP   10 ml





  Q8HR RAYNA   Administration








 





Naproxen Sodium [Aleve] 220 mg PO BID PRN 06/27/17 











Objective





- Vital Signs/Intake & Output


Vital Signs: 





 Vital Signs x48h











  Temp Pulse Resp BP Pulse Ox


 


 06/29/17 05:00  36.4 C L  53 L  16  156/69 H  97


 


 06/29/17 01:00  36.9 C  70  20  187/73 H  96











Intake & Output: 





 Intake & Output











 06/26/17 06/27/17 06/28/17 06/29/17





 23:59 23:59 23:59 23:59


 


Intake Total  1870 2411 


 


Output Total   150 


 


Balance  1870 2261 














- Objective


General Appearance: positive: No acute distress, Other (sleeping soundly, 

arouses relatively easily.  Responds to questions as above, L facial droop, 

pupils equal, her L visual field deficit is less pronounced than on day 1 (~ 

same as yesterday)  R (good eye); peripheral vision starts ~ 150 degrees 

laterally . On Left peripheral vision starts ~ 90 degrees laterally. RLE: 5/5 

strength. Has LLE neglect, but ~ 4/5 strenght plantar/dorsiflexion, can left 

LLE against gravity. LUE occas muscle tensing, but no . RN's note 

impulsiveness w/ eating as noted by SLP)


Eyes Bilateral: positive: EOMI (the R gaze preference persists but improved (

see below under neuro)), Other (pupils equal)


ENT: positive: Other (tongue deviates R when shows (she does not open mouth 

wide enuf on examto eval uvula), can push tongue against buccal mucosa of L 

cheek)


Respiratory: positive: No respiratory distress, Breath sounds nml


Cardiovascular: positive: No murmur (to auscultation SOUNDS regular because of 

slow rate, but on tele is afib w/ slow ventricular respnose ~ 55 -60 when awake 

and up,)


Abdomen: positive: Nml bowel sounds, No distention.  negative: Tenderness (

initially jumped when I started palpating, but then tolerated abdominal 

palpation, adult pad)


Skin: positive: Warm, Dry


Neurologic/Psychiatric: positive: Other (somewhat flat affect (family states 

she is "pretty even keel at home") smiles spontaneously to appropriate 

situation.  drowsy w/ exam this am, but followed commands and makes appropriate 

request EOMI, pupils equal ; she does follow finger to extremes of gaze, and)





- Lab Results


Fish Bones: 


 06/29/17 05:00





 06/29/17 05:00


Other Labs: 





 Lab Results x24hrs











  06/29/17 06/29/17 06/28/17 Range/Units





  05:00 05:00 20:37 


 


WBC   10.1   (4.8-10.8)  x10^3/uL


 


RBC   4.56   (4.20-5.40)  10^6/uL


 


Hgb   13.3   (12.0-16.0)  g/dL


 


Hct   39.2   (37.0-47.0)  %


 


MCV   86.0   (81.0-99.0)  fL


 


MCH   29.2   (27.0-31.0)  pg


 


MCHC   33.9   (32.0-36.0)  g/dL


 


RDW   14.8   (12.0-15.0)  %


 


Plt Count   197   (130-450)  10^3/uL


 


MPV   7.9   (7.9-10.8)  fL


 


Neut #   6.3   (1.5-6.6)  10^3/uL


 


Lymph #   2.5   (1.5-3.5)  10^3/uL


 


Mono #   0.8   (0.0-1.0)  10^3/uL


 


Eos #   0.3   (0.0-0.7)  10^3/uL


 


Baso #   0.1   (0.0-0.1)  10^3/uL


 


Absolute Nucleated RBC   0.01   x10^3/uL


 


Nucleated RBCs   0.1   /100WBC


 


Sodium  139    (135-145)  mmol/L


 


Potassium  3.8    (3.5-5.0)  mmol/L


 


Chloride  101    (101-111)  mmol/L


 


Carbon Dioxide  29    (21-32)  mmol/L


 


Anion Gap  9.0    (6-13)  


 


BUN  19    (6-20)  mg/dL


 


Creatinine  1.3 H    (0.4-1.0)  mg/dL


 


Estimated GFR (MDRD)  39 L    (>89)  


 


Glucose  162 H    ()  mg/dL


 


POC Whole Bld Glucose    211 H  (70 - 100)  mg/dL


 


Calcium  9.1    (8.5-10.3)  mg/dL


 


Total Bilirubin     (0.2-1.0)  mg/dL


 


AST     (10-42)  IU/L


 


ALT     (10-60)  IU/L


 


Alkaline Phosphatase     ()  IU/L


 


Total Protein     (6.7-8.2)  g/dL


 


Albumin     (3.2-5.5)  g/dL


 


Globulin     (2.1-4.2)  g/dL


 


Albumin/Globulin Ratio     (1.0-2.2)  














  06/28/17 06/28/17 06/28/17 Range/Units





  17:02 11:43 08:16 


 


WBC     (4.8-10.8)  x10^3/uL


 


RBC     (4.20-5.40)  10^6/uL


 


Hgb     (12.0-16.0)  g/dL


 


Hct     (37.0-47.0)  %


 


MCV     (81.0-99.0)  fL


 


MCH     (27.0-31.0)  pg


 


MCHC     (32.0-36.0)  g/dL


 


RDW     (12.0-15.0)  %


 


Plt Count     (130-450)  10^3/uL


 


MPV     (7.9-10.8)  fL


 


Neut #     (1.5-6.6)  10^3/uL


 


Lymph #     (1.5-3.5)  10^3/uL


 


Mono #     (0.0-1.0)  10^3/uL


 


Eos #     (0.0-0.7)  10^3/uL


 


Baso #     (0.0-0.1)  10^3/uL


 


Absolute Nucleated RBC     x10^3/uL


 


Nucleated RBCs     /100WBC


 


Sodium     (135-145)  mmol/L


 


Potassium     (3.5-5.0)  mmol/L


 


Chloride     (101-111)  mmol/L


 


Carbon Dioxide     (21-32)  mmol/L


 


Anion Gap     (6-13)  


 


BUN     (6-20)  mg/dL


 


Creatinine     (0.4-1.0)  mg/dL


 


Estimated GFR (MDRD)     (>89)  


 


Glucose     ()  mg/dL


 


POC Whole Bld Glucose  174 H  212 H  173 H  (70 - 100)  mg/dL


 


Calcium     (8.5-10.3)  mg/dL


 


Total Bilirubin     (0.2-1.0)  mg/dL


 


AST     (10-42)  IU/L


 


ALT     (10-60)  IU/L


 


Alkaline Phosphatase     ()  IU/L


 


Total Protein     (6.7-8.2)  g/dL


 


Albumin     (3.2-5.5)  g/dL


 


Globulin     (2.1-4.2)  g/dL


 


Albumin/Globulin Ratio     (1.0-2.2)  














  06/28/17 06/28/17 Range/Units





  05:30 05:30 


 


WBC   10.9 H  (4.8-10.8)  x10^3/uL


 


RBC   4.61  (4.20-5.40)  10^6/uL


 


Hgb   13.6  (12.0-16.0)  g/dL


 


Hct   39.6  (37.0-47.0)  %


 


MCV   85.9  (81.0-99.0)  fL


 


MCH   29.5  (27.0-31.0)  pg


 


MCHC   34.4  (32.0-36.0)  g/dL


 


RDW   14.7  (12.0-15.0)  %


 


Plt Count   193  (130-450)  10^3/uL


 


MPV   8.1  (7.9-10.8)  fL


 


Neut #   6.8 H  (1.5-6.6)  10^3/uL


 


Lymph #   2.9  (1.5-3.5)  10^3/uL


 


Mono #   0.8  (0.0-1.0)  10^3/uL


 


Eos #   0.3  (0.0-0.7)  10^3/uL


 


Baso #   0.1  (0.0-0.1)  10^3/uL


 


Absolute Nucleated RBC   0.00  x10^3/uL


 


Nucleated RBCs   0.0  /100WBC


 


Sodium  138   (135-145)  mmol/L


 


Potassium  3.7   (3.5-5.0)  mmol/L


 


Chloride  103   (101-111)  mmol/L


 


Carbon Dioxide  25   (21-32)  mmol/L


 


Anion Gap  10.0   (6-13)  


 


BUN  17   (6-20)  mg/dL


 


Creatinine  1.3 H   (0.4-1.0)  mg/dL


 


Estimated GFR (MDRD)  39 L   (>89)  


 


Glucose  163 H   ()  mg/dL


 


POC Whole Bld Glucose    (70 - 100)  mg/dL


 


Calcium  9.0   (8.5-10.3)  mg/dL


 


Total Bilirubin  1.0   (0.2-1.0)  mg/dL


 


AST  24   (10-42)  IU/L


 


ALT  16   (10-60)  IU/L


 


Alkaline Phosphatase  65   ()  IU/L


 


Total Protein  7.0   (6.7-8.2)  g/dL


 


Albumin  4.0   (3.2-5.5)  g/dL


 


Globulin  3.0   (2.1-4.2)  g/dL


 


Albumin/Globulin Ratio  1.3   (1.0-2.2)  














Assessment/Plan





- Problem List


(1) Acute ischemic stroke


Impression: 


6/29: R MCA stroke w/ left neglect,  with likely both cardioembolic stroke, and 

atherosclerotic disease as well as noted before 


Clinically stable and slightly improved re: visual defect, but LUE increased 

weakness noted yesterday persists (OT notes as well w/ LLE ? weaker)


re:R gaze preference persists but not as pronounced , +impulsiveness 


Note(the CT 6/28 was to r/o bleed prior to initiation of anticoag for afib ); 

The R MCA stroke was clinically evolving on admission as previously noted. NO 

bleed on CT (it was compared with 6/27 MRI >24 hrs before, not with "MRI 

earlier today")





Discussed w/ Dr. Jerald Del Toro neuro again this AM after examining patient    


 - ASA for atherosclerotic disease (Bilat PCA stenosis is likely atherosclerotic

)


 -anticoagulation for afib (start xarelto today (d/w'd Dr. Marques BP ~160 which Dr. Marques advised before starting


- statin started on admit


 started lisinopril 5 mg yesterday.  continue that dose based on BP (Dr. Marques/

Katey neuro advised can titrate as outpt if  SBP's ~ 160)


-working on DM management increased lantus last pm, will increase SSI today to 

moderate


  -on Lovenox for VTE prophylaxis in hospital (since admit); starting xarelto 

renal dosed today, so stopping lovenox for VTE prophylaxis_


 





Disposition:PT, OT, SLP following daily;   Given fatigue currently, not likely 

to initially tolerate 3 hrs for neuro rehab, ? SNF tomorrow vs sat if stable w/ 

later transition to neuro rehab








2) Hypertension; As above started lisinopril 6/28;   last pm, 150's this 

am, (I checked her this am,  prior to lisinopril dose


     127/77 noted in RN documentation ; Rechecked and is 165 following am 

lisinopril 5mg.





(3) Atrial fibrillation


Impression: 


afib with slow ventricular response


 rate ~50-60 asleep, rare upper 30's when sleeping, often low 40's asleep (HR 

always increases w/ stimulation


rate controlled (no room to add BB or nondihydropiridineCCB ; NO AVN blocking 

agents)


 spoke w/ Dr. Doyle at [facility]; he indicated  this may still be 

excess vagal response from the acute stroke


 only advises (as above) no AVN blocking agent, and if persists in future could 

consider outpt cardiology eval; no recs currently 


 Echo normal EF, no obvious WMA in setting of dysrthymia. Mod RVE, Severe ASIF (

ikey  read), TSH normal





4)Diabetes


not previously on treatment  A1C 8.1, average 186


 increased PM lantus yesterday, increasing correctional coverage today (goal 140

-180 in house)


Since will be in monitored setting (SNF) after acute stroke will continue the 

lantus and correctional x ~ 2 wks, then start low dose metformin


 With her GFR 39, sulfonylurea risky for hypoglycemia


  


(5) CKD (chronic kidney disease) likley due to CKD


did not go to PCP but rarely


baseline not known but suspect it is 1.3-1.5 based on little change w/ IVF


starting low dose ACEI for BP control, will monitor for hyperkalemia or 

worsening Cr on the addition of ACEI . IF worse Cr, will change to amlodipine





(6 Urinary tract infection


Impression: 


not likely r/t her presentation now that we know had stroke


 alpha heme strep


day 2 of ceftriaxone 6/28


Son says she does have frequent UTI's. Since diabetic and cant entirely tell if 

she did have symtpoms will treat 4 more days keflex


(this MIGHT have been asymptomatic bactueria)


 














(3) Atrial fibrillation


Qualifiers: 


   Atrial fibrillation type: unspecified   Qualified Code(s): I48.91 - 

Unspecified atrial fibrillation   








(5) CKD (chronic kidney disease)


Qualifiers: 


   Chronic kidney disease stage: stage 3 (moderate)   Qualified Code(s): N18.3 

- Chronic kidney disease, stage 3 (moderate)   





(6) Urinary tract infection


Qualifiers: 


   Urinary tract infection type: acute cystitis   Hematuria presence: without 

hematuria   Qualified Code(s): N30.00 - Acute cystitis without hematuria

## 2017-06-30 LAB
BASOPHILS NFR BLD AUTO: 0.1 10^3/UL (ref 0–0.1)
BASOPHILS NFR BLD AUTO: 0.6 %
EOSINOPHIL # BLD AUTO: 0.3 10^3/UL (ref 0–0.7)
EOSINOPHIL NFR BLD AUTO: 3.1 %
ERYTHROCYTE [DISTWIDTH] IN BLOOD BY AUTOMATED COUNT: 14.6 % (ref 12–15)
HCT VFR BLD AUTO: 39.2 % (ref 37–47)
HGB UR QL STRIP: 13.2 G/DL (ref 12–16)
LYMPHOCYTES # SPEC AUTO: 2.4 10^3/UL (ref 1.5–3.5)
LYMPHOCYTES NFR BLD AUTO: 22.3 %
MCH RBC QN AUTO: 29 PG (ref 27–31)
MCHC RBC AUTO-ENTMCNC: 33.7 G/DL (ref 32–36)
MCV RBC AUTO: 86 FL (ref 81–99)
MONOCYTES # BLD AUTO: 0.9 10^3/UL (ref 0–1)
MONOCYTES NFR BLD AUTO: 8.3 %
NEUTROPHILS # BLD AUTO: 7.2 10^3/UL (ref 1.5–6.6)
NEUTROPHILS # SNV AUTO: 10.9 X10^3/UL (ref 4.8–10.8)
NEUTROPHILS NFR BLD AUTO: 65.7 %
NRBC # BLD AUTO: 0 /100WBC
PDW BLD AUTO: 7.9 FL (ref 7.9–10.8)
RBC MAR: 4.56 10^6/UL (ref 4.2–5.4)
WBC # BLD: 10.9 X10^3/UL

## 2017-06-30 RX ADMIN — ATORVASTATIN CALCIUM SCH MG: 40 TABLET, FILM COATED ORAL at 21:21

## 2017-06-30 RX ADMIN — INSULIN ASPART SCH: 100 INJECTION, SOLUTION INTRAVENOUS; SUBCUTANEOUS at 23:27

## 2017-06-30 RX ADMIN — POLYETHYLENE GLYCOL 3350 SCH GM: 17 POWDER, FOR SOLUTION ORAL at 11:09

## 2017-06-30 RX ADMIN — INSULIN ASPART SCH: 100 INJECTION, SOLUTION INTRAVENOUS; SUBCUTANEOUS at 23:02

## 2017-06-30 RX ADMIN — CEPHALEXIN SCH MG: 250 CAPSULE ORAL at 15:44

## 2017-06-30 RX ADMIN — SODIUM CHLORIDE, PRESERVATIVE FREE SCH: 5 INJECTION INTRAVENOUS at 15:44

## 2017-06-30 RX ADMIN — CEPHALEXIN SCH MG: 250 CAPSULE ORAL at 06:56

## 2017-06-30 RX ADMIN — INSULIN ASPART SCH UNIT: 100 INJECTION, SOLUTION INTRAVENOUS; SUBCUTANEOUS at 10:34

## 2017-06-30 RX ADMIN — CEPHALEXIN SCH: 250 CAPSULE ORAL at 07:01

## 2017-06-30 RX ADMIN — INSULIN ASPART SCH UNIT: 100 INJECTION, SOLUTION INTRAVENOUS; SUBCUTANEOUS at 21:19

## 2017-06-30 RX ADMIN — SODIUM CHLORIDE, PRESERVATIVE FREE SCH: 5 INJECTION INTRAVENOUS at 06:54

## 2017-06-30 RX ADMIN — SODIUM CHLORIDE SCH MLS/HR: 9 INJECTION, SOLUTION INTRAVENOUS at 06:35

## 2017-06-30 RX ADMIN — SODIUM CHLORIDE, PRESERVATIVE FREE SCH ML: 5 INJECTION INTRAVENOUS at 21:22

## 2017-06-30 RX ADMIN — CEPHALEXIN SCH MG: 250 CAPSULE ORAL at 21:21

## 2017-06-30 RX ADMIN — INSULIN ASPART SCH UNIT: 100 INJECTION, SOLUTION INTRAVENOUS; SUBCUTANEOUS at 12:47

## 2017-06-30 RX ADMIN — ASPIRIN SCH MG: 81 TABLET, CHEWABLE ORAL at 10:29

## 2017-06-30 RX ADMIN — INSULIN GLARGINE SCH UNIT: 100 INJECTION, SOLUTION SUBCUTANEOUS at 21:20

## 2017-06-30 RX ADMIN — ENOXAPARIN SODIUM SCH MG: 100 INJECTION SUBCUTANEOUS at 21:21

## 2017-06-30 NOTE — PROVIDER PROGRESS NOTE
Subjective





- Prog Note Date


Prog Note Date: 06/30/17


Prog Note Time: 09:52 (seen ~ 7:30, again 9)





- Subjective


Subjective: 





no longer has HA, recalls all the activity/exams before CT last Pm


says she ate already today "scrambled eggs" (but per RN she did not eat today)





PT and OT note that when they stood her yesterday she continued to urinate (

more than when she was sitting on commode)








Current Medications





- Current Medications


Current Medications: 








Active Medications











Generic Name Dose Route Start Last Admin





  Trade Name Freq  PRN Reason Stop Dose Admin


 


Acetaminophen  650 mg 06/27/17 01:10 06/29/17 13:38





  Tylenol  PO   650 mg





  Q4HR PRN   Administration





  Pain 1 to 4   


 


Aspirin  81 mg 06/27/17 13:00 06/29/17 08:21





  [place] Aspirin  PO   81 mg





  DAILY RAYNA   Administration


 


Atorvastatin Calcium  80 mg 06/27/17 21:00 06/29/17 21:58





  Lipitor  PO   80 mg





  QPM RAYNA   Administration


 


Cephalexin  250 mg 06/29/17 14:00 06/30/17 07:01





  Keflex  PO   Not Given





  TID RAYNA   


 


Sodium Chloride  1,000 mls @ 100 mls/hr 06/29/17 22:40 06/30/17 06:35





  Normal Saline 0.9%  IV   100 mls/hr





  .Q10H RAYNA   Administration


 


Insulin Aspart  1 - 9 unit 06/29/17 08:00 06/29/17 22:13





  Novolog  SUBQ   3 unit





  0800,1200,1700,2100 RAYNA   Administration





  Protocol   


 


Insulin Glargine  8 unit 06/28/17 21:00 06/29/17 22:14





  Lantus Solostar  SUBQ   8 unit





  QPM RAYNA   Administration


 


Ondansetron HCl  4 mg 06/27/17 01:10  





  Zofran Inj  IVP   





  Q6HR PRN   





  Nausea / Vomiting   


 


Polyethylene Glycol  17 gm 06/27/17 09:00 06/29/17 08:22





  Miralax  PO   17 gm





  DAILY RAYNA   Administration


 


Sodium Chloride  10 ml 06/27/17 01:10  





  Normal Saline Flush 0.9%  IVP   





  PRN PRN   





  AS NEEDED PER PROVIDER ORDERS   


 


Sodium Chloride  10 ml 06/27/17 06:00 06/30/17 06:54





  Normal Saline Flush 0.9%  IVP   Not Given





  Q8HR RAYNA   








 





Naproxen Sodium [Aleve] 220 mg PO BID PRN 06/27/17 











Objective





- Vital Signs/Intake & Output


Reviewed Vital Signs: Yes


Vital Signs: 





 Vital Signs x48h











  Temp Pulse Resp BP Pulse Ox


 


 06/30/17 09:00  36.6 C  60  20  144/75 H  96


 


 06/30/17 04:43  36.4 C L  62  20  149/63 H  97








overnight since boluses yesterday, SBP  164/70 945pm, 162/77 00:58 am,  149/63 

~ 445 am, 9am 144/75  


Yesterday (~ noon when i checked 162/72


Intake & Output: 





 Intake & Output











 06/27/17 06/28/17 06/29/17 06/30/17





 23:59 23:59 23:59 23:59


 


Intake Total 1870 2411 1647 1026


 


Output Total  150  


 


Balance 1870 2261 1647 1026














- Objective


General Appearance: positive: Other (sleeping when I first entered went back ~ 8

:30 am, awake, alert ,smiling at me, still w / R gaze preference but does look 

left if I ask her to no nystagmus, does follow EOM's.  (yesterday she was 

looking Left a little more spontaneously))


Eyes Bilateral: positive: Other (as above R gaze preference, will look L when 

asked by examiner)


ENT: positive: Other (no residual food in mouth)


Respiratory: positive: No respiratory distress, Breath sounds nml


Cardiovascular: positive: No murmur, Other (on tele still; afib with slow 

ventricular response ; continues when asleep rate in 40's, rare upper 30, 

consistently increases to mid50's when aroused .   BP as above)


Abdomen: positive: Nml bowel sounds, No distention, Other (later in shift 

assisted up out of chair and urinated in upright position PVR after this nearly 

1000 cc).  negative: Tenderness (adult pad on, nontender)


Skin: positive: Warm, Dry.  negative: Diaphoresis


Extremities: positive: No pedal edema


Neurologic/Psychiatric: positive: Other (orientation: "in [place]",  August.  

When told its late June and asked which holiday would be coming "4th of July" 

responds to questions albeit slowly. as above R gaze preference but will look 

to left w/ direction Left facial droop, can push tongue agnst inside of left 

cheek, tongue deviated.  LUE occasional involuntary motion (seems involuntary); 

does not squeeze,  LLE ; With MUCH encouragement, she does wiggle toes, and 

tries to lift leg off bed (2-3/5 (does start to flex L knee, and slides heel 

back a short distance proximally, plantar/dorsiflex ~ 3/5 Left  R side still 5/5

)





- Lab Results


Fish Bones: 


 06/30/17 05:54





 06/29/17 05:00


Other Labs: 





 Lab Results x24hrs











  06/30/17 06/30/17 06/29/17 Range/Units





  07:48 05:54 21:07 


 


WBC   10.9 H   (4.8-10.8)  x10^3/uL


 


RBC   4.56   (4.20-5.40)  10^6/uL


 


Hgb   13.2   (12.0-16.0)  g/dL


 


Hct   39.2   (37.0-47.0)  %


 


MCV   86.0   (81.0-99.0)  fL


 


MCH   29.0   (27.0-31.0)  pg


 


MCHC   33.7   (32.0-36.0)  g/dL


 


RDW   14.6   (12.0-15.0)  %


 


Plt Count   194   (130-450)  10^3/uL


 


MPV   7.9   (7.9-10.8)  fL


 


Neut #   7.2 H   (1.5-6.6)  10^3/uL


 


Lymph #   2.4   (1.5-3.5)  10^3/uL


 


Mono #   0.9   (0.0-1.0)  10^3/uL


 


Eos #   0.3   (0.0-0.7)  10^3/uL


 


Baso #   0.1   (0.0-0.1)  10^3/uL


 


Absolute Nucleated RBC   0.01   x10^3/uL


 


Nucleated RBCs   0.0   /100WBC


 


POC Whole Bld Glucose  149 H   182 H  (70 - 100)  mg/dL














  06/29/17 06/29/17 06/29/17 Range/Units





  18:59 16:51 11:33 


 


WBC     (4.8-10.8)  x10^3/uL


 


RBC     (4.20-5.40)  10^6/uL


 


Hgb     (12.0-16.0)  g/dL


 


Hct     (37.0-47.0)  %


 


MCV     (81.0-99.0)  fL


 


MCH     (27.0-31.0)  pg


 


MCHC     (32.0-36.0)  g/dL


 


RDW     (12.0-15.0)  %


 


Plt Count     (130-450)  10^3/uL


 


MPV     (7.9-10.8)  fL


 


Neut #     (1.5-6.6)  10^3/uL


 


Lymph #     (1.5-3.5)  10^3/uL


 


Mono #     (0.0-1.0)  10^3/uL


 


Eos #     (0.0-0.7)  10^3/uL


 


Baso #     (0.0-0.1)  10^3/uL


 


Absolute Nucleated RBC     x10^3/uL


 


Nucleated RBCs     /100WBC


 


POC Whole Bld Glucose  167 H  168 H  191 H  (70 - 100)  mg/dL














Assessment/Plan





- Problem List


(1) Acute ischemic stroke


Impression: 


: 6/30


patient neurologically stable compared with 6/29 (much improved from last 

evening) 


spoke with Dr Marques again re:the repeat CT done last safia. she notes the MCA stroke 

more evident on CT


 given her flucutations in cognition, vital signs she indicted no "right" 

answer re: the antihypertensive and anticoagulation


Risk of further stroke given her various areas of stenoses and afib, and 

possible hemorrhagic conversion given size (although that has not happened


  ~ 1-2 wks if stable in SNF , revisit starting the xarelto and ACEI


contine ASA, statin


 resume lovenox for VTE prophylaxis


likely [facility] in am 








6/29; R MCA stroke w/ left neglect,  with likely both cardioembolic stroke, and 

atherosclerotic disease as well as noted before 


Clinically stable and slightly improved re: visual defect, but LUE increased 

weakness noted yesterday persists (OT notes as well w/ LLE ? weaker)


re:R gaze preference persists but not as pronounced , +impulsiveness 


Note(the CT 6/28 was to r/o bleed prior to initiation of anticoag for afib ); 

The R MCA stroke was clinically evolving on admission as previously noted. NO 

bleed on CT (it was compared with 6/27 MRI >24 hrs before, not with "MRI 

earlier today")





Discussed w/ Dr. Marques [facility] neuro again this AM after examining patient    


 - ASA for atherosclerotic disease (Bilat PCA stenosis is likely atherosclerotic

)


 -anticoagulation for afib (start xarelto today (d/w'd Dr. Marques BP ~160 which Dr. Marques advised before starting


- statin started on admit


 started lisinopril 5 mg yesterday.  continue that dose based on BP (Dr. Marques/

[facility] neuro advised can titrate as outpt if  SBP's ~ 160)


-working on DM management increased lantus last pm, will increase SSI today to 

moderate


  -on Lovenox for VTE prophylaxis in hospital (since admit); starting xarelto 

renal dosed today, so stopping lovenox for VTE prophylaxis_


 





Disposition:PT, OT, SLP following daily;   Given fatigue currently, not likely 

to initially tolerate 3 hrs for neuro rehab, ? SNF tomorrow vs sat if stable w/ 

later transition to neuro rehab








2) Hypertension; 


stopped lisinopril after event 6/30 pm (although was tolerating ok)


 per Dr Marques / neurology; revisit starting antihypertensive and anticoagulation 

once her clinical status is stable ~ 1 week-2 wks  (


SBP's 140's today, most recent 174/72





(3) Atrial fibrillation


Impression: 


afib with slow ventricular response persists; likely excess vagal tone with 

large MCA stroke


 rate ~50-60 asleep, rare upper 30's when sleeping, often low 40's asleep (HR 

always increases w/ stimulation


rate controlled (no room to add BB or nondihydropiridineCCB ; NO AVN blocking 

agents)


 spoke w/ Dr. Doyle at [facility] 6/29 ; he indicated  this may still 

be excess vagal response from the acute stroke


 only advises (as above) no AVN blocking agent, and if persists in future could 

consider outpt cardiology eval; no recs currently 


 Echo normal EF, no obvious WMA in setting of dysrthymia. Mod RVE, Severe ASIF (

ikey  read), TSH normal





4)Diabetes


not previously on treatment  A1C 8.1, average 186


 on pm lantus 8 units, increased correctional coverage 6/29 (goal 140-180 in 

house)


Since will be in monitored setting (SNF) after acute stroke will continue the 

lantus and correctional x ~ 2 wks, then start low dose metformin


 With her GFR 39, sulfonylurea risky for hypoglycemia


  


(5) CKD (chronic kidney disease) likley due to CKD


; BMP not rechecked today





6/29 rare pcp follow up


baseline not known but suspect it is 1.3-1.5 based on little change w/ IVF


started low dose ACEI for BP control, (now held as above) but K, Cr was 

tolerating . 





(6 Urinary tract infection/ urinary retention (only identified retention 6/30)


Impression: 


not likely r/t her presentation now that we know had stroke


 alpha heme strep  changed to Keflex after initial ceftriaxone


Doubt that urinary retention related to stroke


 For now post void residual and q 8 hr intermittant straight cath (prefer not 

to place indwelling catheter)


 may need outpatient urodynamics


 continue frequent toileting











(3) Atrial fibrillation


Qualifiers: 


   Atrial fibrillation type: unspecified   Qualified Code(s): I48.91 - 

Unspecified atrial fibrillation   








(5) CKD (chronic kidney disease)


Qualifiers: 


   Chronic kidney disease stage: stage 3 (moderate)   Qualified Code(s): N18.3 

- Chronic kidney disease, stage 3 (moderate)   





(6) Urinary tract infection


Qualifiers: 


   Urinary tract infection type: acute cystitis   Hematuria presence: without 

hematuria   Qualified Code(s): N30.00 - Acute cystitis without hematuria

## 2017-07-01 ENCOUNTER — HOSPITAL ENCOUNTER (OUTPATIENT)
Dept: HOSPITAL 76 - EMS | Age: 82
Discharge: NURSING, REHAB OR LONG-TERM CARE | End: 2017-07-01
Attending: SURGERY
Payer: MEDICARE

## 2017-07-01 VITALS — DIASTOLIC BLOOD PRESSURE: 76 MMHG | SYSTOLIC BLOOD PRESSURE: 163 MMHG

## 2017-07-01 DIAGNOSIS — I63.9: Primary | ICD-10-CM

## 2017-07-01 LAB
ANION GAP SERPL CALCULATED.4IONS-SCNC: 10 MMOL/L (ref 6–13)
BASOPHILS NFR BLD AUTO: 0.1 10^3/UL (ref 0–0.1)
BASOPHILS NFR BLD AUTO: 0.6 %
BUN SERPL-MCNC: 15 MG/DL (ref 6–20)
CALCIUM UR-MCNC: 9.1 MG/DL (ref 8.5–10.3)
CHLORIDE SERPL-SCNC: 102 MMOL/L (ref 101–111)
CO2 SERPL-SCNC: 26 MMOL/L (ref 21–32)
CREAT SERPLBLD-SCNC: 1 MG/DL (ref 0.4–1)
EOSINOPHIL # BLD AUTO: 0.2 10^3/UL (ref 0–0.7)
EOSINOPHIL NFR BLD AUTO: 2.2 %
ERYTHROCYTE [DISTWIDTH] IN BLOOD BY AUTOMATED COUNT: 14.4 % (ref 12–15)
GFRSERPLBLD MDRD-ARVRAT: 53 ML/MIN/{1.73_M2} (ref 89–?)
GLUCOSE SERPL-MCNC: 157 MG/DL (ref 70–100)
HCT VFR BLD AUTO: 39.2 % (ref 37–47)
HGB UR QL STRIP: 13.5 G/DL (ref 12–16)
LYMPHOCYTES # SPEC AUTO: 2.3 10^3/UL (ref 1.5–3.5)
LYMPHOCYTES NFR BLD AUTO: 21 %
MCH RBC QN AUTO: 29.4 PG (ref 27–31)
MCHC RBC AUTO-ENTMCNC: 34.5 G/DL (ref 32–36)
MCV RBC AUTO: 85.2 FL (ref 81–99)
MONOCYTES # BLD AUTO: 0.9 10^3/UL (ref 0–1)
MONOCYTES NFR BLD AUTO: 8.2 %
NEUTROPHILS # BLD AUTO: 7.5 10^3/UL (ref 1.5–6.6)
NEUTROPHILS # SNV AUTO: 11.1 X10^3/UL (ref 4.8–10.8)
NEUTROPHILS NFR BLD AUTO: 68 %
NRBC # BLD AUTO: 0 /100WBC
PDW BLD AUTO: 8.2 FL (ref 7.9–10.8)
POTASSIUM SERPL-SCNC: 3.6 MMOL/L (ref 3.5–5)
RBC MAR: 4.6 10^6/UL (ref 4.2–5.4)
SODIUM SERPLBLD-SCNC: 138 MMOL/L (ref 135–145)
WBC # BLD: 11.1 X10^3/UL

## 2017-07-01 RX ADMIN — INSULIN ASPART SCH UNIT: 100 INJECTION, SOLUTION INTRAVENOUS; SUBCUTANEOUS at 08:19

## 2017-07-01 RX ADMIN — SODIUM CHLORIDE, PRESERVATIVE FREE SCH: 5 INJECTION INTRAVENOUS at 07:00

## 2017-07-01 RX ADMIN — ACETAMINOPHEN PRN MG: 325 TABLET ORAL at 08:18

## 2017-07-01 RX ADMIN — CEPHALEXIN SCH: 250 CAPSULE ORAL at 07:00

## 2017-07-01 RX ADMIN — ENOXAPARIN SODIUM SCH MG: 100 INJECTION SUBCUTANEOUS at 08:18

## 2017-07-01 RX ADMIN — ASPIRIN SCH MG: 81 TABLET, CHEWABLE ORAL at 08:19

## 2017-07-01 RX ADMIN — POLYETHYLENE GLYCOL 3350 SCH GM: 17 POWDER, FOR SOLUTION ORAL at 08:18

## 2017-07-03 ENCOUNTER — HOSPITAL ENCOUNTER (OUTPATIENT)
Dept: HOSPITAL 76 - LAB.R | Age: 82
Discharge: HOME | End: 2017-07-03
Attending: SKILLED NURSING FACILITY
Payer: MEDICARE

## 2017-07-03 DIAGNOSIS — N39.0: Primary | ICD-10-CM

## 2017-07-03 LAB
CUL URINE ADD CHARGE: (no result)
PH UR STRIP.AUTO: 6 PH (ref 5–7.5)
SP GR UR STRIP.AUTO: 1.02 (ref 1–1.03)
UA CHARGE (STRIP ONLY): (no result)
UA W/ MICROSCOPIC CHARGE: YES
UR CULTURE IF IND: (no result)
UROBILINOGEN UR STRIP.AUTO-MCNC: NEGATIVE MG/DL
WBC # UR MANUAL: (no result) /HPF (ref 0–5)

## 2017-07-03 PROCEDURE — 81003 URINALYSIS AUTO W/O SCOPE: CPT

## 2017-07-03 PROCEDURE — 87086 URINE CULTURE/COLONY COUNT: CPT

## 2017-07-03 PROCEDURE — 81001 URINALYSIS AUTO W/SCOPE: CPT

## 2017-07-03 NOTE — DISCHARGE SUMMARY
DATE OF ADMISSION:  06/27/2017

 

DATE OF DISCHARGE:  07/01/2017

 

ADMITTING PROVIDER: Jovani Langston MD.

 

DISCHARGING PROVIDER: LARS Noble. 

 

PRIMARY CARE PROVIDER: Cristiano Sow MD. 

 

DISCHARGE DIAGNOSES

1. Acute ischemic stroke, cardioembolic, right middle cerebral artery occlusion.

2. Chronic atrial fibrillation.

3. Essential hypertension.

4. Urinary tract infection, alpha strep.

5. Type 2 diabetes mellitus, uncontrolled.

6. Urinary retention.

7. Obstructive sleep apnea, by visualization, not by sleep study.

 

DISCHARGE MEDICATION LIST

1. Tylenol 650 mg by mouth every 6 hours as needed for pain, fever, or headache.

2. Aspirin 81 mg by mouth daily for chronic AFib and CVA prophylaxis.

3. Lipitor 20 mg by mouth at bedtime for CVA prophylaxis.

4. Keflex 250 mg by mouth 3 times a day for 5 days for a UTI.

5. Zofran ODT 4 mg sublingual every 6 hours as needed for nausea.

6. Miralax 17 grams by mouth daily for constipation.

7. Colace 100 mg by mouth twice a day for constipation.

8. PCP to reevaluate progress and start anticoagulation with DOAC or warfarin in 1-2 weeks and if rem
ains hypertensive, start anti-hypertensive therapy.

9. Insulin orders blood sugars a.c. and at bedtime 8 units of Lantus at bedtime, NovoLog low dose sli
ding scale insulin.

 

HOSPITAL COURSE: The patient presented to the emergency department with report that she had been in h
er normal health until 10 a.m. that morning. At 9 p.m. she was found to be very confused, slow to res
pond to questions, and looked very dazed. She did not have any focal weakness at that time, but was v
seble slow to move and she was therefore back to the emergency department. She was found to have waxing
 and waning encephalopathy and was admitted for possible TIA or stroke. UTI also considered as a diff
erential. Her neuro imaging revealed a scattered acute infarct in the right frontal paraventricular w
olvin matter and the right retrogonal white matter. The distribution poor response to the right MCA an
d/or right MCA watershed territories. No evidence of hemorrhage. There was absence of right MCA flow 
void concerning for occlusion. The patient had findings of both cardioembolic and atherosclerotic cer
ebral disease. She was started on aspirin and Lovenox for DVT prophylaxis. She was also then started 
on anti-hypertensive therapy 48 hours following episode and had an event of hypotension to a low of 1
07/55. This was then rediscussed with the neurologist and a repeat CT showed just further evolution o
f a stroke, but no new stroke. The patient's evaluation developed right-sided gaze, left upper extrem
ity hemiplegia, left lower extremity weakness, fluctuating levels of alertness, and slow to respond t
o questions. She was evaluated by physical and occupational therapy, which both felt that inpatient r
ehab would be beneficial. Speech therapy evaluated her and placed her on a dysphagia mechanical diet.
 The patient is not able to tolerate 3 hours of therapy at this time and therefore, she was transferr
ed to a skilled nursing facility for rehab. If her insurance improves, inpatient neuro rehab could be
 considered for this otherwise pleasant lady. Upon further discussion with [facility], it would be
 requested that the primary care provider reevaluate the patient's blood pressure in 1-2 weeks and if
 stable at that time, begin anti-hypertensive therapy and also start her on anticoagulation therapy w
ith DOAC or warfarin. At this time, as her eating volumes are not consistent, we will continue on sli
ding scale insulin. She can further be transitioned to oral medication once she is tolerating foods a
nd eating more consistently. The patient did experience urinary retention while in the hospital, requ
ired straight catheterization x1. This was likely compounded by lack of mobility and lack of p.o. int
blanka coupled with constipation. The patient started on routine bowel meds, the p.r.n. meds as needed. 
These can be held if she develops any looser frequent stools. The patient will continue on 250 mg of 
Keflex for an additional 5 days for 1 week of treatment for alpha-strep UTI. At the time of discharge
, a goals of care conversation was had with the patient's 2 sons and daughter-in-law. A POLST form wa
s completed. She will be DNR, limited interventions. She does not want intubation. BiPAP would need t
o be discussed if indicated. No decision was made regarding tube feedings as this would depend on the
 patient's baseline neurological function. If this were to become an issue, would need to rediscuss w
ith the family at that time. The patient was also noted to have sleep apnea, the family states that t
his is not new, she has never undergone routine sleep apnea testing, but they have noted it at her re
sidence. Recommend followup sleep apnea testing as an outpatient if the patient is agreeable. 

 

VITAL SIGNS AT DISCHARGE: Temperature 36.5 degrees Celsius, heart rate 52 beats per minute, blood pre
ssure 111/59, respiratory rate 17 breaths per minute, O2 saturation 97% on room air. 

 

TIME SPENT ON DISCHARGE: Greater than 30 minutes. 

 

 

 

DD:07/01/2017 18:03:00  DT: 07/03/2017 03:58  JOB #: [id number]  EXT JOB #:[id number]

## 2017-07-19 ENCOUNTER — HOSPITAL ENCOUNTER (OUTPATIENT)
Dept: HOSPITAL 76 - LAB.R | Age: 82
Discharge: HOME | End: 2017-07-19
Attending: SKILLED NURSING FACILITY
Payer: MEDICARE

## 2017-07-19 DIAGNOSIS — D64.9: Primary | ICD-10-CM

## 2017-07-19 LAB
BASOPHILS NFR BLD AUTO: 0.1 10^3/UL (ref 0–0.1)
BASOPHILS NFR BLD AUTO: 0.9 %
EOSINOPHIL # BLD AUTO: 0.2 10^3/UL (ref 0–0.7)
EOSINOPHIL NFR BLD AUTO: 2 %
ERYTHROCYTE [DISTWIDTH] IN BLOOD BY AUTOMATED COUNT: 14.4 % (ref 12–15)
HCT VFR BLD AUTO: 37.6 % (ref 37–47)
HGB UR QL STRIP: 12.6 G/DL (ref 12–16)
LYMPHOCYTES # SPEC AUTO: 2.1 10^3/UL (ref 1.5–3.5)
LYMPHOCYTES NFR BLD AUTO: 16.9 %
MCH RBC QN AUTO: 28.6 PG (ref 27–31)
MCHC RBC AUTO-ENTMCNC: 33.4 G/DL (ref 32–36)
MCV RBC AUTO: 85.6 FL (ref 81–99)
MONOCYTES # BLD AUTO: 0.9 10^3/UL (ref 0–1)
MONOCYTES NFR BLD AUTO: 7.5 %
NEUTROPHILS # BLD AUTO: 9 10^3/UL (ref 1.5–6.6)
NEUTROPHILS # SNV AUTO: 12.4 X10^3/UL (ref 4.8–10.8)
NEUTROPHILS NFR BLD AUTO: 72.7 %
NRBC # BLD AUTO: 0.1 /100WBC
PDW BLD AUTO: 8.4 FL (ref 7.9–10.8)
RBC MAR: 4.4 10^6/UL (ref 4.2–5.4)
WBC # BLD: 12.4 X10^3/UL

## 2017-07-19 PROCEDURE — 85025 COMPLETE CBC W/AUTO DIFF WBC: CPT

## 2017-07-25 ENCOUNTER — HOSPITAL ENCOUNTER (OUTPATIENT)
Dept: HOSPITAL 76 - LAB.R | Age: 82
Discharge: HOME | End: 2017-07-25
Attending: SKILLED NURSING FACILITY
Payer: COMMERCIAL

## 2017-07-25 DIAGNOSIS — I12.9: Primary | ICD-10-CM

## 2017-07-25 DIAGNOSIS — N18.9: ICD-10-CM

## 2017-07-25 DIAGNOSIS — E11.9: ICD-10-CM

## 2017-07-25 LAB
ALBUMIN/GLOB SERPL: 0.8 {RATIO} (ref 1–2.2)
ANION GAP SERPL CALCULATED.4IONS-SCNC: 9 MMOL/L (ref 6–13)
BASOPHILS NFR BLD AUTO: 0.1 10^3/UL (ref 0–0.1)
BASOPHILS NFR BLD AUTO: 1.1 %
BILIRUB BLD-MCNC: 0.5 MG/DL (ref 0.2–1)
BUN SERPL-MCNC: 32 MG/DL (ref 6–20)
CALCIUM UR-MCNC: 9.1 MG/DL (ref 8.5–10.3)
CHLORIDE SERPL-SCNC: 99 MMOL/L (ref 101–111)
CO2 SERPL-SCNC: 29 MMOL/L (ref 21–32)
CREAT SERPLBLD-SCNC: 1.2 MG/DL (ref 0.4–1)
EOSINOPHIL # BLD AUTO: 0.3 10^3/UL (ref 0–0.7)
EOSINOPHIL NFR BLD AUTO: 3 %
ERYTHROCYTE [DISTWIDTH] IN BLOOD BY AUTOMATED COUNT: 14.6 % (ref 12–15)
EST. AVERAGE GLUCOSE BLD GHB EST-MCNC: 174 MG/DL (ref 70–100)
GFRSERPLBLD MDRD-ARVRAT: 43 ML/MIN/{1.73_M2} (ref 89–?)
GLOBULIN SER-MCNC: 3.9 G/DL (ref 2.1–4.2)
GLUCOSE SERPL-MCNC: 166 MG/DL (ref 70–100)
HBA1C BLD-MCNC: 0.82 G/DL
HCT VFR BLD AUTO: 35.9 % (ref 37–47)
HGB UR QL STRIP: 12.5 G/DL (ref 12–16)
LYMPHOCYTES # SPEC AUTO: 2.1 10^3/UL (ref 1.5–3.5)
LYMPHOCYTES NFR BLD AUTO: 20.4 %
MCH RBC QN AUTO: 29 PG (ref 27–31)
MCHC RBC AUTO-ENTMCNC: 34.8 G/DL (ref 32–36)
MCV RBC AUTO: 83.4 FL (ref 81–99)
MONOCYTES # BLD AUTO: 0.7 10^3/UL (ref 0–1)
MONOCYTES NFR BLD AUTO: 6.9 %
NEUTROPHILS # BLD AUTO: 7.1 10^3/UL (ref 1.5–6.6)
NEUTROPHILS # SNV AUTO: 10.3 X10^3/UL (ref 4.8–10.8)
NEUTROPHILS NFR BLD AUTO: 68.6 %
NRBC # BLD AUTO: 0 /100WBC
PDW BLD AUTO: 7.8 FL (ref 7.9–10.8)
POTASSIUM SERPL-SCNC: 3.8 MMOL/L (ref 3.5–5)
PROT SPEC-MCNC: 7.2 G/DL (ref 6.7–8.2)
RBC MAR: 4.31 10^6/UL (ref 4.2–5.4)
SODIUM SERPLBLD-SCNC: 137 MMOL/L (ref 135–145)
WBC # BLD: 10.3 X10^3/UL

## 2017-07-25 PROCEDURE — 83036 HEMOGLOBIN GLYCOSYLATED A1C: CPT

## 2017-07-25 PROCEDURE — 85025 COMPLETE CBC W/AUTO DIFF WBC: CPT

## 2017-07-25 PROCEDURE — 82043 UR ALBUMIN QUANTITATIVE: CPT

## 2017-07-25 PROCEDURE — 80053 COMPREHEN METABOLIC PANEL: CPT

## 2017-08-29 ENCOUNTER — HOSPITAL ENCOUNTER (OUTPATIENT)
Dept: HOSPITAL 76 - LAB.R | Age: 82
Discharge: HOME | End: 2017-08-29
Attending: SKILLED NURSING FACILITY
Payer: MEDICARE

## 2017-08-29 DIAGNOSIS — N39.0: Primary | ICD-10-CM

## 2017-08-29 PROCEDURE — 81003 URINALYSIS AUTO W/O SCOPE: CPT

## 2017-08-29 PROCEDURE — 81001 URINALYSIS AUTO W/SCOPE: CPT

## 2017-08-30 LAB
PH UR STRIP.AUTO: 6 PH (ref 5–7.5)
SP GR UR STRIP.AUTO: 1.01 (ref 1–1.03)
UA CHARGE (STRIP ONLY): (no result)
UA W/ MICROSCOPIC CHARGE: YES
UROBILINOGEN UR STRIP.AUTO-MCNC: NEGATIVE MG/DL

## 2017-09-25 ENCOUNTER — HOSPITAL ENCOUNTER (OUTPATIENT)
Dept: HOSPITAL 76 - LAB.R | Age: 82
Discharge: HOME | End: 2017-09-25
Attending: SKILLED NURSING FACILITY
Payer: MEDICARE

## 2017-09-25 DIAGNOSIS — N18.9: ICD-10-CM

## 2017-09-25 DIAGNOSIS — E11.9: Primary | ICD-10-CM

## 2017-09-25 DIAGNOSIS — I48.91: ICD-10-CM

## 2017-09-25 LAB
ALBUMIN/GLOB SERPL: 1.1 {RATIO} (ref 1–2.2)
ANION GAP SERPL CALCULATED.4IONS-SCNC: 7 MMOL/L (ref 6–13)
BASOPHILS NFR BLD AUTO: 0.1 10^3/UL (ref 0–0.1)
BASOPHILS NFR BLD AUTO: 0.7 %
BILIRUB BLD-MCNC: 0.6 MG/DL (ref 0.2–1)
BUN SERPL-MCNC: 20 MG/DL (ref 6–20)
CALCIUM UR-MCNC: 8.9 MG/DL (ref 8.5–10.3)
CHLORIDE SERPL-SCNC: 101 MMOL/L (ref 101–111)
CO2 SERPL-SCNC: 29 MMOL/L (ref 21–32)
CREAT SERPLBLD-SCNC: 1 MG/DL (ref 0.4–1)
EOSINOPHIL # BLD AUTO: 0.4 10^3/UL (ref 0–0.7)
EOSINOPHIL NFR BLD AUTO: 4.3 %
ERYTHROCYTE [DISTWIDTH] IN BLOOD BY AUTOMATED COUNT: 15.3 % (ref 12–15)
EST. AVERAGE GLUCOSE BLD GHB EST-MCNC: 171 MG/DL (ref 70–100)
GFRSERPLBLD MDRD-ARVRAT: 53 ML/MIN/{1.73_M2} (ref 89–?)
GLOBULIN SER-MCNC: 2.9 G/DL (ref 2.1–4.2)
GLUCOSE SERPL-MCNC: 166 MG/DL (ref 70–100)
HBA1C BLD-MCNC: 0.67 G/DL
HCT VFR BLD AUTO: 31.5 % (ref 37–47)
HGB UR QL STRIP: 10.8 G/DL (ref 12–16)
LYMPHOCYTES # SPEC AUTO: 2.3 10^3/UL (ref 1.5–3.5)
LYMPHOCYTES NFR BLD AUTO: 22.5 %
MCH RBC QN AUTO: 28.6 PG (ref 27–31)
MCHC RBC AUTO-ENTMCNC: 34.3 G/DL (ref 32–36)
MCV RBC AUTO: 83.4 FL (ref 81–99)
MONOCYTES # BLD AUTO: 0.9 10^3/UL (ref 0–1)
MONOCYTES NFR BLD AUTO: 8.7 %
NEUTROPHILS # BLD AUTO: 6.5 10^3/UL (ref 1.5–6.6)
NEUTROPHILS # SNV AUTO: 10.1 X10^3/UL (ref 4.8–10.8)
NEUTROPHILS NFR BLD AUTO: 63.8 %
NRBC # BLD AUTO: 0 /100WBC
PDW BLD AUTO: 8.4 FL (ref 7.9–10.8)
POTASSIUM SERPL-SCNC: 4.1 MMOL/L (ref 3.5–5)
PROT SPEC-MCNC: 6.1 G/DL (ref 6.7–8.2)
RBC MAR: 3.78 10^6/UL (ref 4.2–5.4)
SODIUM SERPLBLD-SCNC: 137 MMOL/L (ref 135–145)
WBC # BLD: 10.1 X10^3/UL

## 2017-09-25 PROCEDURE — 80053 COMPREHEN METABOLIC PANEL: CPT

## 2017-09-25 PROCEDURE — 85025 COMPLETE CBC W/AUTO DIFF WBC: CPT

## 2017-09-25 PROCEDURE — 83036 HEMOGLOBIN GLYCOSYLATED A1C: CPT

## 2018-01-04 ENCOUNTER — HOSPITAL ENCOUNTER (OUTPATIENT)
Dept: HOSPITAL 76 - LAB.R | Age: 83
Discharge: HOME | End: 2018-01-04
Attending: PHYSICIAN ASSISTANT
Payer: MEDICARE

## 2018-01-04 DIAGNOSIS — R32: ICD-10-CM

## 2018-01-04 DIAGNOSIS — R33.9: Primary | ICD-10-CM

## 2018-01-04 PROCEDURE — 87086 URINE CULTURE/COLONY COUNT: CPT

## 2018-01-04 PROCEDURE — 87077 CULTURE AEROBIC IDENTIFY: CPT

## 2018-04-18 ENCOUNTER — HOSPITAL ENCOUNTER (OUTPATIENT)
Dept: HOSPITAL 76 - EMS | Age: 83
Discharge: HOME | End: 2018-04-18
Attending: SURGERY
Payer: MEDICARE

## 2018-04-18 ENCOUNTER — HOSPITAL ENCOUNTER (OUTPATIENT)
Dept: HOSPITAL 76 - EMS | Age: 83
Discharge: ANOTHER HOSPITAL | End: 2018-04-18
Attending: SURGERY
Payer: MEDICARE

## 2018-04-18 ENCOUNTER — HOSPITAL ENCOUNTER (EMERGENCY)
Dept: HOSPITAL 76 - ED | Age: 83
Discharge: HOME | End: 2018-04-18
Payer: MEDICARE

## 2018-04-18 VITALS — DIASTOLIC BLOOD PRESSURE: 56 MMHG | SYSTOLIC BLOOD PRESSURE: 126 MMHG

## 2018-04-18 DIAGNOSIS — Z79.01: ICD-10-CM

## 2018-04-18 DIAGNOSIS — F03.90: ICD-10-CM

## 2018-04-18 DIAGNOSIS — N39.0: Primary | ICD-10-CM

## 2018-04-18 DIAGNOSIS — R30.9: Primary | ICD-10-CM

## 2018-04-18 DIAGNOSIS — Z86.73: ICD-10-CM

## 2018-04-18 DIAGNOSIS — Z74.01: ICD-10-CM

## 2018-04-18 DIAGNOSIS — E11.9: ICD-10-CM

## 2018-04-18 DIAGNOSIS — I48.91: ICD-10-CM

## 2018-04-18 DIAGNOSIS — E78.00: ICD-10-CM

## 2018-04-18 DIAGNOSIS — I10: Primary | ICD-10-CM

## 2018-04-18 LAB
CLARITY UR REFRACT.AUTO: (no result)
GLUCOSE UR QL STRIP.AUTO: NEGATIVE MG/DL
KETONES UR QL STRIP.AUTO: NEGATIVE MG/DL
NITRITE UR QL STRIP.AUTO: NEGATIVE
PH UR STRIP.AUTO: 7 PH (ref 5–7.5)
PROT UR STRIP.AUTO-MCNC: >=300 MG/DL
RBC # UR STRIP.AUTO: (no result) /UL
SP GR UR STRIP.AUTO: 1.02 (ref 1–1.03)
SQUAMOUS URNS QL MICRO: (no result)
UROBILINOGEN UR QL STRIP.AUTO: (no result) E.U./DL
UROBILINOGEN UR STRIP.AUTO-MCNC: NEGATIVE MG/DL
WBC CLUMPS URNS QL MICRO: PRESENT

## 2018-04-18 PROCEDURE — 99283 EMERGENCY DEPT VISIT LOW MDM: CPT

## 2018-04-18 PROCEDURE — 85610 PROTHROMBIN TIME: CPT

## 2018-04-18 PROCEDURE — 81001 URINALYSIS AUTO W/SCOPE: CPT

## 2018-04-18 PROCEDURE — 87086 URINE CULTURE/COLONY COUNT: CPT

## 2018-04-18 PROCEDURE — 51701 INSERT BLADDER CATHETER: CPT

## 2018-04-18 PROCEDURE — 87077 CULTURE AEROBIC IDENTIFY: CPT

## 2018-04-18 PROCEDURE — 81003 URINALYSIS AUTO W/O SCOPE: CPT

## 2018-04-18 NOTE — ED PHYSICIAN DOCUMENTATION
PD HPI FEMALE 





- Stated complaint


Stated Complaint: FEM 





- History obtained from


History obtained from: Patient, EMS





- History of Present Illness


Timing - onset: Other (84-year-old woman anticoagulated on warfarin presents 

with concern for UTI from the [facility] care facility.  Reportedly she has had 

urinary frequency dysuria and suprapubic pain for a few days.  They did a dip 

there which was positive for leukocytes.  There is no report of fever or 

worsening of her memory.)





Review of Systems


Unable to obtain: Dementia





PD PAST MEDICAL HISTORY





- Past Medical History


Cardiovascular: Hypertension, High cholesterol, Atrial fibrillation


Respiratory: None


Neuro: CVA


Endocrine/Autoimmune: Type 2 diabetes


GI: None


GYN: None


: None


HEENT: None


Psych: None


Musculoskeletal: Osteoarthritis


Derm: None





- Past Surgical History


Past Surgical History: No


General: Cholecystectomy, Appendectomy


/GYN: Hysterectomy


HEENT: Tonsil/Adenoidectomy





- Present Medications


Home Medications: 


 Ambulatory Orders











 Medication  Instructions  Recorded  Confirmed


 


Naproxen Sodium [Aleve] 220 mg PO BID PRN 06/27/17 06/27/17


 


Acetaminophen 2 tab PO TID PRN 04/18/18 04/18/18


 


Amox/Clav 875/125 [Augmentin] 1 each PO Q12H #10 tablet 04/18/18 


 


Atorvastatin Calcium 1 tab PO DAILY 04/18/18 04/18/18


 


Bethanechol Chloride 20 mg PO QID 04/18/18 04/18/18


 


Docusate Calcium 1 cap PO DAILY 04/18/18 04/18/18


 


Gabapentin 1 cap PO BID 04/18/18 04/18/18


 


Insulin Glargine [Lantus Solostar] 10 units SQ DAILY 04/18/18 04/18/18


 


Mirtazapine 15 mg PO DAILY 04/18/18 04/18/18


 


Polyethylene Glycol 3350 [Miralax] 8.5 oz PO DAILY 04/18/18 04/18/18


 


Tamsulosin [Flomax] 1 tab PO DAILY 04/18/18 04/18/18


 


Warfarin Sodium [Coumadin] 1 tab PO DAILY 04/18/18 04/18/18


 


Warfarin Sodium [Coumadin] 1 tab PO DAILY 04/18/18 04/18/18














- Allergies


Allergies/Adverse Reactions: 


 Allergies











Allergy/AdvReac Type Severity Reaction Status Date / Time


 


Sulfa (Sulfonamide Allergy  Unknown Verified 04/18/18 14:10





Antibiotics)     














- Social History


Does the pt smoke?: No


Smoking Status: Never smoker


Does the pt drink ETOH?: No


Does the pt have substance abuse?: No





- POLST


Patient has POLST: No


POLST Status: Full Code





PD ED PE NORMAL





- Vitals


Vital signs reviewed: Yes





- General


General: Other (Pleasant, alert and oriented 1, cooperative.)





- Abdomen


Abdomen: Soft, Non tender





- Neuro


Eye Opening: Spontaneous


Motor: Obeys Commands


Verbal: Confused


GCS Score: 14





- Psych


Psych: Normal mood, Normal affect





Results





- Vitals


Vitals: 


 Vital Signs - 24 hr











  04/18/18





  14:07


 


Temperature 36.6 C


 


Heart Rate 51 L


 


Respiratory 14





Rate 


 


Blood Pressure 134/49 H


 


O2 Saturation 98








 Oxygen











O2 Source                      Room air

















- Labs


Labs: 


 Laboratory Tests











  04/18/18 04/18/18





  14:00 14:22


 


Whole Blood INR   2.8 H


 


Urine Color  YELLOW 


 


Urine Clarity  CLOUDY 


 


Urine pH  7.0 


 


Ur Specific Gravity  1.025 


 


Urine Protein  >=300 H 


 


Urine Glucose (UA)  NEGATIVE 


 


Urine Ketones  NEGATIVE 


 


Urine Occult Blood  MODERATE H 


 


Urine Nitrite  NEGATIVE 


 


Urine Bilirubin  NEGATIVE 


 


Urine Urobilinogen  0.2 (NORMAL) 


 


Ur Leukocyte Esterase  MODERATE H 


 


Urine RBC  11-25 H 


 


Urine WBC  >25 H 


 


Urine WBC Clumps  PRESENT 


 


Ur Squamous Epith Cells  NONE SEEN 


 


Urine Bacteria  Many H 


 


Ur Microscopic Review  INDICATED 


 


Urine Culture Comments  INDICATED 














Departure





- Departure


Disposition: 01 Home, Self Care


Clinical Impression: 


 Cystitis





Condition: Good


Record reviewed to determine appropriate education?: Yes


Instructions:  ED UTI Cystitis Female


Prescriptions: 


Amox/Clav 875/125 [Augmentin] 1 each PO Q12H #10 tablet


Comments: 


We will culture your urine, the results should be done in 48-72 hours.  If an 

antibiotic change is necessary we will call you.  Return if worse in the 

meantime, especially if you develop increasing flank pain, fevers, or cannot 

keep down the medication.





Often times when you start antibiotics while you are taking anticoagulants such 

as Coumadin or warfarin, your INR will go up.  You need to have your INR 

checked frequently while on the antibiotics.  Have it checked in 3 days, again 

in 6 days, and at least weekly while you are on antibiotics.  Dose adjustments 

of your anticoagulants may be necessary.

## 2018-05-01 ENCOUNTER — HOSPITAL ENCOUNTER (OUTPATIENT)
Dept: HOSPITAL 76 - LAB.WCP | Age: 83
Discharge: HOME | End: 2018-05-01
Attending: FAMILY MEDICINE
Payer: MEDICARE

## 2018-05-01 DIAGNOSIS — N39.0: ICD-10-CM

## 2018-05-01 DIAGNOSIS — Z79.01: Primary | ICD-10-CM

## 2018-05-01 LAB
CLARITY UR REFRACT.AUTO: CLEAR
GLUCOSE UR QL STRIP.AUTO: NEGATIVE MG/DL
INR PPP: 3.2 (ref 0.8–1.2)
KETONES UR QL STRIP.AUTO: NEGATIVE MG/DL
NITRITE UR QL STRIP.AUTO: POSITIVE
PH UR STRIP.AUTO: 5.5 PH (ref 5–7.5)
PROT UR STRIP.AUTO-MCNC: 30 MG/DL
PROTHROM ACT/NOR PPP: 34.7 SECS (ref 9.9–12.6)
RBC # UR STRIP.AUTO: (no result) /UL
RBC # URNS HPF: (no result) /HPF (ref 0–5)
SP GR UR STRIP.AUTO: 1.02 (ref 1–1.03)
SQUAMOUS URNS QL MICRO: (no result)
UROBILINOGEN UR QL STRIP.AUTO: (no result) E.U./DL
UROBILINOGEN UR STRIP.AUTO-MCNC: NEGATIVE MG/DL
WBC CLUMPS URNS QL MICRO: PRESENT

## 2018-05-01 PROCEDURE — 87086 URINE CULTURE/COLONY COUNT: CPT

## 2018-05-01 PROCEDURE — 36415 COLL VENOUS BLD VENIPUNCTURE: CPT

## 2018-05-01 PROCEDURE — 85610 PROTHROMBIN TIME: CPT

## 2018-05-01 PROCEDURE — 81001 URINALYSIS AUTO W/SCOPE: CPT

## 2018-05-17 ENCOUNTER — HOSPITAL ENCOUNTER (OUTPATIENT)
Dept: HOSPITAL 76 - LAB.WCP | Age: 83
End: 2018-05-17
Attending: FAMILY MEDICINE
Payer: MEDICARE

## 2018-05-17 DIAGNOSIS — L89.609: Primary | ICD-10-CM

## 2018-05-17 PROCEDURE — 87070 CULTURE OTHR SPECIMN AEROBIC: CPT

## 2018-05-17 PROCEDURE — 87181 SC STD AGAR DILUTION PER AGT: CPT

## 2018-05-17 PROCEDURE — 87205 SMEAR GRAM STAIN: CPT

## 2018-06-03 ENCOUNTER — HOSPITAL ENCOUNTER (EMERGENCY)
Dept: HOSPITAL 76 - ED | Age: 83
Discharge: HOME | End: 2018-06-03
Payer: MEDICARE

## 2018-06-03 ENCOUNTER — HOSPITAL ENCOUNTER (OUTPATIENT)
Dept: HOSPITAL 76 - EMS | Age: 83
Discharge: ANOTHER HOSPITAL | End: 2018-06-03
Attending: SURGERY
Payer: MEDICARE

## 2018-06-03 ENCOUNTER — HOSPITAL ENCOUNTER (OUTPATIENT)
Dept: HOSPITAL 76 - EMS | Age: 83
Discharge: HOME | End: 2018-06-03
Attending: SURGERY
Payer: MEDICARE

## 2018-06-03 VITALS — DIASTOLIC BLOOD PRESSURE: 67 MMHG | SYSTOLIC BLOOD PRESSURE: 164 MMHG
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
## 2018-06-03 DIAGNOSIS — F03.90: ICD-10-CM

## 2018-06-03 DIAGNOSIS — M79.671: ICD-10-CM
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
## 2018-06-03 DIAGNOSIS — Z79.4: ICD-10-CM

## 2018-06-03 DIAGNOSIS — I69.954: ICD-10-CM

## 2018-06-03 DIAGNOSIS — E86.0: Primary | ICD-10-CM

## 2018-06-03 DIAGNOSIS — Z79.01: ICD-10-CM

## 2018-06-03 DIAGNOSIS — I48.91: ICD-10-CM

## 2018-06-03 DIAGNOSIS — I10: ICD-10-CM

## 2018-06-03 DIAGNOSIS — M25.552: Primary | ICD-10-CM
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
## 2018-06-03 DIAGNOSIS — E11.9: ICD-10-CM

## 2018-06-03 DIAGNOSIS — E78.00: ICD-10-CM

## 2018-06-03 DIAGNOSIS — M25.551: Primary | ICD-10-CM

## 2018-06-03 LAB
ALBUMIN DIAFP-MCNC: 3.3 G/DL (ref 3.2–5.5)
ALBUMIN/GLOB SERPL: 1 {RATIO} (ref 1–2.2)
ALP SERPL-CCNC: 65 IU/L (ref 42–121)
ALT SERPL W P-5'-P-CCNC: 18 IU/L (ref 10–60)
ANION GAP SERPL CALCULATED.4IONS-SCNC: 6 MMOL/L (ref 6–13)
AST SERPL W P-5'-P-CCNC: 21 IU/L (ref 10–42)
BASOPHILS NFR BLD AUTO: 0.1 10^3/UL (ref 0–0.1)
BASOPHILS NFR BLD AUTO: 1 %
BILIRUB BLD-MCNC: 0.5 MG/DL (ref 0.2–1)
BUN SERPL-MCNC: 26 MG/DL (ref 6–20)
CALCIUM UR-MCNC: 8.8 MG/DL (ref 8.5–10.3)
CHLORIDE SERPL-SCNC: 104 MMOL/L (ref 101–111)
CLARITY UR REFRACT.AUTO: CLEAR
CO2 SERPL-SCNC: 29 MMOL/L (ref 21–32)
CREAT SERPLBLD-SCNC: 1.1 MG/DL (ref 0.4–1)
EOSINOPHIL # BLD AUTO: 0.2 10^3/UL (ref 0–0.7)
EOSINOPHIL NFR BLD AUTO: 3.4 %
ERYTHROCYTE [DISTWIDTH] IN BLOOD BY AUTOMATED COUNT: 15.3 % (ref 12–15)
GFRSERPLBLD MDRD-ARVRAT: 47 ML/MIN/{1.73_M2} (ref 89–?)
GLOBULIN SER-MCNC: 3.2 G/DL (ref 2.1–4.2)
GLUCOSE SERPL-MCNC: 131 MG/DL (ref 70–100)
GLUCOSE UR QL STRIP.AUTO: NEGATIVE MG/DL
HGB UR QL STRIP: 10.3 G/DL (ref 12–16)
KETONES UR QL STRIP.AUTO: NEGATIVE MG/DL
LIPASE SERPL-CCNC: 36 U/L (ref 22–51)
LYMPHOCYTES # SPEC AUTO: 1.9 10^3/UL (ref 1.5–3.5)
LYMPHOCYTES NFR BLD AUTO: 27.9 %
MCH RBC QN AUTO: 28.1 PG (ref 27–31)
MCHC RBC AUTO-ENTMCNC: 32.6 G/DL (ref 32–36)
MCV RBC AUTO: 86.4 FL (ref 81–99)
MONOCYTES # BLD AUTO: 0.6 10^3/UL (ref 0–1)
MONOCYTES NFR BLD AUTO: 8.7 %
NEUTROPHILS # BLD AUTO: 4.1 10^3/UL (ref 1.5–6.6)
NEUTROPHILS # SNV AUTO: 6.9 X10^3/UL (ref 4.8–10.8)
NEUTROPHILS NFR BLD AUTO: 59 %
NITRITE UR QL STRIP.AUTO: NEGATIVE
PDW BLD AUTO: 8.2 FL (ref 7.9–10.8)
PH UR STRIP.AUTO: 6 PH (ref 5–7.5)
PLATELET # BLD: 197 10^3/UL (ref 130–450)
PROT SPEC-MCNC: 6.5 G/DL (ref 6.7–8.2)
PROT UR STRIP.AUTO-MCNC: NEGATIVE MG/DL
RBC # UR STRIP.AUTO: (no result) /UL
RBC MAR: 3.66 10^6/UL (ref 4.2–5.4)
SODIUM SERPLBLD-SCNC: 139 MMOL/L (ref 135–145)
SP GR UR STRIP.AUTO: 1.02 (ref 1–1.03)
UROBILINOGEN UR QL STRIP.AUTO: (no result) E.U./DL
UROBILINOGEN UR STRIP.AUTO-MCNC: NEGATIVE MG/DL

## 2018-06-03 PROCEDURE — 81003 URINALYSIS AUTO W/O SCOPE: CPT

## 2018-06-03 PROCEDURE — 87086 URINE CULTURE/COLONY COUNT: CPT

## 2018-06-03 PROCEDURE — 96360 HYDRATION IV INFUSION INIT: CPT

## 2018-06-03 PROCEDURE — 36415 COLL VENOUS BLD VENIPUNCTURE: CPT

## 2018-06-03 PROCEDURE — 81001 URINALYSIS AUTO W/SCOPE: CPT

## 2018-06-03 PROCEDURE — 85025 COMPLETE CBC W/AUTO DIFF WBC: CPT

## 2018-06-03 PROCEDURE — 83690 ASSAY OF LIPASE: CPT

## 2018-06-03 PROCEDURE — 80053 COMPREHEN METABOLIC PANEL: CPT

## 2018-06-03 PROCEDURE — 99284 EMERGENCY DEPT VISIT MOD MDM: CPT

## 2018-06-03 NOTE — XRAY REPORT
EXAM:

LEFT HIP AND PELVIS RADIOGRAPHY

 

EXAM DATE: 6/3/2018 09:31 PM.

 

HISTORY: Left hip pain after falling.

 

COMPARISONS: None.

 

TECHNIQUE: 2 views.

 

FINDINGS: 

Bones:  No fracture or focal bony lesion. 

 

Joints:  No evidence of dislocation. There is right greater than left hip degenerative disease.

 

Soft Tissues:  No unexpected soft tissue findings. 

 

IMPRESSION:  No evidence of fracture or dislocation. Plain film can be insensitive for detection of h
ip fracture in elderly patients. If there is concern for occult proximal femur fracture, MRI could be
 used for further evaluation. 

 

[facility]  

Referring Provider Line: [phone]

 

SITE ID: [id number]

## 2018-06-03 NOTE — ED PHYSICIAN DOCUMENTATION
PD HPI Fall





- Stated complaint


Stated Complaint: GLF, R HIP PAIN





- Chief complaint


Chief Complaint: Trauma Ext





- History obtained from


History obtained from: Patient





- History of Present Illness


Mechanism of injury: Slipped (Fell while transferring from commode to chair.)


Fall distance: Sitting position


Where injury occurred: Other ([facility].)


Injury(ies) location: Left Lower Extremity





- Additional information


Additional information: 





The patient is an 84-year-old female who arrives via ambulance for evaluation 

after falling at [facility] where she resides.  She was 

transferring from the commode to wheelchair, when she fell to the floor.  When 

being assisted to the chair by staff, she was complaining of pain in her hip.  

History from the patient is not reliable due to her dementia.  Review of his 

past medical record reveals a history of atrial fibrillation and diabetes.





Review of Systems


Unable to obtain: Dementia


Cardiac: denies: Chest pain / pressure


Respiratory: denies: Dyspnea


GI: reports: Abdominal Pain (Reports mild lower abdominal discomfort.).  denies

: Vomiting


: denies: Dysuria


Musculoskeletal: reports: Extremity pain (Reports mild discomfort in her left 

leg/knee.).  denies: Neck pain, Back pain


Neurologic: reports: Generalized weakness, Confused (chronically).  denies: 

Headache, Head injury, LOC





PD PAST MEDICAL HISTORY





- Past Medical History


Cardiovascular: Hypertension, High cholesterol, Atrial fibrillation


Respiratory: None


Endocrine/Autoimmune: Type 2 diabetes


GI: None


GYN: None


: None


HEENT: None


Psych: None


Musculoskeletal: Osteoarthritis


Derm: None





- Past Surgical History


Past Surgical History: No


General: Cholecystectomy, Appendectomy


/GYN: Hysterectomy


HEENT: Tonsil/Adenoidectomy





- Present Medications


Home Medications: 


 Ambulatory Orders











 Medication  Instructions  Recorded  Confirmed


 


Naproxen Sodium [Aleve] 220 mg PO BID PRN 06/27/17 06/27/17


 


Acetaminophen 2 tab PO TID PRN 04/18/18 04/18/18


 


Amox/Clav 875/125 [Augmentin] 1 each PO Q12H #10 tablet 04/18/18 


 


Atorvastatin Calcium 1 tab PO DAILY 04/18/18 04/18/18


 


Bethanechol Chloride 20 mg PO QID 04/18/18 04/18/18


 


Docusate Calcium 1 cap PO DAILY 04/18/18 04/18/18


 


Gabapentin 1 cap PO BID 04/18/18 04/18/18


 


Insulin Glargine [Lantus Solostar] 10 units SQ DAILY 04/18/18 04/18/18


 


Mirtazapine 15 mg PO DAILY 04/18/18 04/18/18


 


Polyethylene Glycol 3350 [Miralax] 8.5 oz PO DAILY 04/18/18 04/18/18


 


Tamsulosin [Flomax] 1 tab PO DAILY 04/18/18 04/18/18


 


Warfarin Sodium [Coumadin] 1 tab PO DAILY 04/18/18 04/18/18


 


Warfarin Sodium [Coumadin] 1 tab PO DAILY 04/18/18 04/18/18














- Allergies


Allergies/Adverse Reactions: 


 Allergies











Allergy/AdvReac Type Severity Reaction Status Date / Time


 


Sulfa (Sulfonamide Allergy  Unknown Verified 06/03/18 19:57





Antibiotics)     














- Living Situation


Living Arrangement: reports: Assisted living ([facility]





- Social History


Does the pt smoke?: No


Smoking Status: Never smoker


Does the pt drink ETOH?: No


Does the pt have substance abuse?: No





- Immunizations


Immunizations are current?: Yes





- POLST


Patient has POLST: No


POLST Status: Full Code





PD ED PE NORMAL





- Vitals


Vital signs reviewed: Yes (Systolic hypertension.)





- General


General: Other (Alert, deconditioned, elderly female, who is confused, 

consistent with dementia.)





- HEENT


HEENT: Atraumatic, EOMI, Other (Dry oral mucosa.)





- Neck


Neck: No bony TTP, No JVD





- Cardiac


Cardiac: RRR





- Respiratory


Respiratory: No respiratory distress, Clear bilaterally





- Abdomen


Abdomen: Soft, Non tender





- Back


Back: No CVA TTP, No spinal TTP





- Derm


Derm: No rash





- Extremities


Extremities: No tenderness to palpate, No edema, No calf tenderness / cord, 

Other (Decreased range of motion of major joints, including hips bilaterally, 

due to muscular spasticity.  There is slight discomfort with flexion and 

extension of the left hip, without any apparent discomfort with internal and 

external rotation.  Distal neurovascular is intact.)





- Neuro


Neuro: Other (Alert, but disoriented, stating it is March 2020.  She tells me 

her mother is standing next to her, pointing to the left side of the gurney. 

She is generally deconditioned, without focal motor or sensory deficit.)





Results





- Vitals


Vitals: 


 Vital Signs - 24 hr











  06/03/18 06/03/18





  21:40 23:07


 


Heart Rate 106 H 106 H


 


Respiratory 18 18





Rate  


 


Blood Pressure 153/65 H 164/67 H


 


O2 Saturation 99 95








 Oxygen











O2 Source                      Room air

















- Labs


Labs: 


 Laboratory Tests











  06/03/18 06/03/18 06/03/18





  21:34 21:53 21:53


 


WBC   6.9 


 


RBC   3.66 L 


 


Hgb   10.3 L 


 


Hct   31.6 L 


 


MCV   86.4 


 


MCH   28.1 


 


MCHC   32.6 


 


RDW   15.3 H 


 


Plt Count   197 


 


MPV   8.2 


 


Neut # (Auto)   4.1 


 


Lymph # (Auto)   1.9 


 


Mono # (Auto)   0.6 


 


Eos # (Auto)   0.2 


 


Baso # (Auto)   0.1 


 


Absolute Nucleated RBC   0.00 


 


Nucleated RBC %   0.0 


 


Sodium    139


 


Potassium    3.6


 


Chloride    104


 


Carbon Dioxide    29


 


Anion Gap    6.0


 


BUN    26 H


 


Creatinine    1.1 H


 


Estimated GFR (MDRD)    47 L


 


Glucose    131 H


 


Calcium    8.8


 


Total Bilirubin    0.5


 


AST    21


 


ALT    18


 


Alkaline Phosphatase    65


 


Total Protein    6.5 L


 


Albumin    3.3


 


Globulin    3.2


 


Albumin/Globulin Ratio    1.0


 


Lipase    36


 


Urine Color  YELLOW  


 


Urine Clarity  CLEAR  


 


Urine pH  6.0  


 


Ur Specific Gravity  1.020  


 


Urine Protein  NEGATIVE  


 


Urine Glucose (UA)  NEGATIVE  


 


Urine Ketones  NEGATIVE  


 


Urine Occult Blood  TRACE-LYSE  


 


Urine Nitrite  NEGATIVE  


 


Urine Bilirubin  NEGATIVE  


 


Urine Urobilinogen  0.2 (NORMAL)  


 


Ur Leukocyte Esterase  NEGATIVE  


 


Ur Microscopic Review  NOT INDICATED  


 


Urine Culture Comments  NOT INDICATED  














- Rads (name of study)


  ** Left hip


Radiology: Prelim report reviewed, EMP read contemporaneously, See rad report (

No evidence of fracture or dislocation.  Plain films can be insensitive for 

detection of hip fracture in elderly patients.  If there is concern for occult 

proximal femur fracture, MRI could be used for further evaluation.)





PD MEDICAL DECISION MAKING





- ED course


Complexity details: reviewed old records, reviewed results, re-evaluated patient

, considered differential, d/w patient


ED course: 


The patient's evaluation revealed no evidence of fracture or other acute bony 

abnormality.  X-ray of her left hip is negative, and she did demonstrate 

ability to stand at the bedside, bearing weight on each of her legs.  Her 

hydration status appeared volume depleted, and she was borderline tachycardic 

upon initial arrival.  There is no evidence of urinary tract infection, with a 

negative urinalysis, and white count is normal.


Treatment in the emergency department included administration of normal saline 

1 L IV.  She was discharged back to [facility], and was transported by 

[facility] ambulance.








Departure





- Departure


Disposition: 01 Home, Self Care


Clinical Impression: 


 Dehydration





Fall


Qualifiers:


 Encounter type: initial encounter Qualified Code(s): W19.XXXA - Unspecified 

fall, initial encounter





Dementia


Qualifiers:


 Dementia type: unspecified type Dementia behavioral disturbance: without 

behavioral disturbance Qualified Code(s): F03.90 - Unspecified dementia without 

behavioral disturbance





Condition: Stable


Instructions:  ED Mechanical Fall


Follow-Up: 


Cristiano Sow MD [Provider Admit Priv/Credential] - 


Comments: 


Drink plenty of fluids.


Follow up with your primary physician within 2 weeks.  Call to schedule 

appointment.


Return to the emergency department if you develop increasing pain, persistent 

falling, or otherwise worsening symptoms.


Discharge Date/Time: 06/03/18 23:09

## 2018-06-26 ENCOUNTER — HOSPITAL ENCOUNTER (OUTPATIENT)
Dept: HOSPITAL 76 - PC | Age: 83
Discharge: HOME | End: 2018-06-26
Attending: NURSE PRACTITIONER
Payer: MEDICARE

## 2018-06-26 DIAGNOSIS — F03.90: ICD-10-CM

## 2018-06-26 DIAGNOSIS — R63.4: ICD-10-CM

## 2018-06-26 DIAGNOSIS — E11.9: ICD-10-CM

## 2018-06-26 DIAGNOSIS — Z79.01: ICD-10-CM

## 2018-06-26 DIAGNOSIS — I48.91: ICD-10-CM

## 2018-06-26 DIAGNOSIS — I69.354: ICD-10-CM

## 2018-06-26 DIAGNOSIS — Z66: ICD-10-CM

## 2018-06-26 DIAGNOSIS — Z51.5: Primary | ICD-10-CM

## 2018-06-26 DIAGNOSIS — Z79.4: ICD-10-CM

## 2018-06-26 NOTE — CONSULTATION NOTE
Palliative Care Consultation





- Referral


Referring Provider: Dr Sow


Time of Visit: 2018.  11:20 - 12:50


Referral setting: Assisted living (Seen in home setting due to taxing and 

considerable effort required to leave the home due to immobility and wheelchair 

bound from left-sided weakness secondary to CVA.)


Referral Reason: Dementia decline / Pal Care





- Information Sources


Records reviewed: RN notes reviewed, Previous records reviewed


History/Review of Systems obtained from: Patient, Family, Nursing


Exam limitations: Clinical condition (Dementia, short-term memory deficits)





- History of Present Illness


Brief History of Present Illness: 





-Thank you, Dr. Sow, for asking the palliative care consult service to be 

involved in the care of your patient. I am asked to provide support regarding a 

recent decline in functional status, an increase in falls, and advanced care 

planning.





-Pleasant 84-year-old woman with advancing dementia and L hemiparesis following 

a CVA a year ago, originally at [facility], now living at [facility].  Her son, 

Melquiades, is at today's visit. He lives in [place], [place], teaches Graphics in 

Business Education at [employer] in [place].





-Medical history: Dementia without behaviors, CVA with residual L side 

hemiparesis x 1 year; atrial fibrillation on anticoagulation, CKD III, HLD, HTN

, DM II with insulin dependence, diabetic peripheral neuropathy, R heel 

pressure wound Stage III, osteoarthritis.


 


-Patient is on Home Health with RN services for wound care on R heel.


-Pressure wound on R heel was treated with cephalexin for infection in May, 

ending 2018.


-PCP was recently changed from Dr Pantoja to Dr Sow.





-Patient had CVA one year ago, with residual L side hemiparesis, non-ambulatory

, requires full assist for transfers and ADLs, incontinent of B/B.


-After her stroke, she was at [facility] for rehab, then transferred to [facility] 

long-term [facility]. Family was not happy with her there, and they moved her to 

[facility]


-Most recent visit to ED was 6/3/18 for fall during transfer and pain to L hip. 

Radiographs and CT scan negative.


-Pt was given IV fluids while at ED due to mild dehydration.





-First mild signs of patient's dementia started around 10 years ago, but her 

function and status has steadily worsened over the past few months.


-Palliative care has been recommended due to slow functional and cognitive 

decline, increase in falls and weight loss.


-Weight 187.8 lbs on 18, down from 196.6 lbs on 18. 8.8 lb loss or 4%.





-Patient is pleasant, maintains eye contact, conversant, cooperative during 

this exam.


-Weak voice, she's always had a soft voice according to her son, but it's much 

weaker now.


-Pain from L side hemiparesis, also R hip pain secondary to osteoarthritis, had 

refused hip replacement in the past.








Medical/Surgical History





- Past Medical History


Cardiovascular: reports: Hypertension, High cholesterol, Atrial fibrillation (

on anticoagulation)


Respiratory: reports: None


Neuro: reports: Dementia, CVA (2017)


Endocrine/Autoimmune: reports: Type 2 diabetes


GI: reports: None, Other (h/o hernia, location unknown)


GYN: reports: None


: reports: None


HEENT: reports: None


Psych: reports: None


Musculoskeletal: reports: Osteoarthritis


Derm: reports: None


MRSA Hx?: No





- Past Surgical History


General: reports: Cholecystectomy, Appendectomy, Other (hernia repair, date 

unknown)


/GYN: reports: Hysterectomy


HEENT: reports: Tonsil/Adenoidectomy





- Substance History


Use: Uses substance without health or social issues: NONE


Tobacco Details: Cigarettes (Never smoker)





Social History





- Living Situation


Living arrangement: Assisted living ([facility] dementia unit)


Living Situation: With caregiver(s)


Support System: 





Son/Melquiades HORNER, visits every other week with his wife. He lives in [place], [place]. He is quite attentive. Other son is living in patient's house, infrequently 

visits.








Family History





- Family History


Family History Comment/Other: 





Her mother  in her 70s, cause unknown. Father's age and cause of death 

unknown. Her brother  5 years ago, dementia and etoh abuse. Her  

 5 years ago, age 92-93, complications of dementia. Her son Markel  age 

62 , complications of asthma.








Medications/Allergies





- Medications


Home Medications: 


 Ambulatory Orders











 Medication  Instructions  Recorded  Confirmed


 


Acetaminophen 650 mg PO TID PRN 18


 


Atorvastatin Calcium 20 mg PO QPM 18


 


Bethanechol Chloride 20 mg PO QID 18


 


Gabapentin 300 mg PO BID 18


 


Insulin Glargine [Lantus Solostar] 10 units SQ DAILY 18


 


Mirtazapine 7.5 mg PO DAILY 18


 


Polyethylene Glycol 3350 [Miralax] 8.5 oz PO BID 18


 


Tamsulosin [Flomax] 0.4 mg PO DAILY 18


 


Acetaminophen 650 mg PO Q6H PRN 18


 


Docusate Sodium 100 mg PO BID 18


 


Warfarin Sodium 1.25 mg PO .MON, 18


 


Warfarin Sodium 2.5 mg PO .T,W,TH,S,S 18














- Allergies


Allergies/Adverse Reactions: 


 Allergies











Allergy/AdvReac Type Severity Reaction Status Date / Time


 


Sulfa (Sulfonamide Allergy  Unknown Verified 18 19:57





Antibiotics)     














Review of Systems





- Constitutional


Constitutional: reports: Weight loss (187.8 lbs 18. 193.6 lbs 18. 196.6 

lbs /. 203.4 lbs /.  4% weight loss since 2018.).  denies: 

Poor appetite





- Eyes


Eyes: reports: Vision loss (on L side, post-CVA)





- Ears, Nose & Throat


Ears, Nose & Throat: denies: Hearing loss





- Cardiovascular


Cardiovascular: denies: Chest pain, Decr. exercise tolerance





- Respiratory


Respiratory: denies: Cough, SOB at rest, SOB with exertion





- Gastrointestinal


Gastrointestinal: denies: Constipation, Diarrhea, Change in bowel habits





- Genitourinary


Genitourinary: denies: Dysuria, Frequency





- Psychiatric


Psychiatric: reports: Depression (mirtazapine low dose), Other (h/o social 

anxiety; not comfortable in crowds)





- Endocrine


Endocrine: reports: Intolerance to cold (persistently c/o feeling cold)





Physical Exam





- Vital Signs


Temperature: 96.5 F


Pulse Rate: 51


O2 Saturation: 99 (room air)


Blood Pressure: 130/64





- Physical Exam


General Appearance: positive: No acute distress, Alert


Eyes Bilateral: positive: EOMI, No lid inflammation, Conjunctivae nml, No 

scleral icterus


ENT: positive: No signs of dehydration


Neck: positive: Trachea midline


Cardiovascular: positive: Regular rate & rhythm


Respiratory: positive: Diminished throughout (clear)


Skin: positive: Pressure wound (R heel Stage III)


Extremities: positive: Pedal edema (mild)


Neurologic/Psychiatric: positive: Mood/affect nml, Disoriented to time





Palliative Care





- POLST


Patient has POLST: Yes


POLST Status: DNR, Comfort Measures


Pain: No pain


Tiredness/Fatigue: Mild (1-3)


Drowsiness/Sedation: None


Nausea: None


Depression: None


Anxiety: Mild (1-3)


Dyspnea: None


Anorexia: None


Performance Status: 





Residual L side hemiparesis with LUE pain, from CVA a year ago. Non-ambulatory, 

requires full assist for transfers and ADLs, incontinent of B/B.





Verbal, speaks in full sentences, weak voice, pleasant and interactive, 

maintains eye contact.





Able to feed herself with cueing.








- Palliative Care


Discussion: 





Patient [place] in [place]. She was  5 years ago. She is a 

retired , worked at [employer] for 30 years.





-Her eldest son  age 62.  Son, Melquiades, who is her DPOA, is at today's 

visit. He and his wife have lived in [place], [place], for 17 years. He teaches 

Business Ed/graphic arts at [employer] in [place]. He and his wife 

visit the patient every other week.





-Her other son lived with her in [place] for 3 years prior to her move to 

[facility]. He doesn't visit patient often, according to son 

Melquiades.





-Melquiades reports patient has no memory of anything during the past 10 years.


-She was always very responsible with paying bills on time, etc, but 1 year 

prior to her CVA, started payiing bills late, being more forgetful, doing 

things out of character for her.





-Melquiades reports she would never want to be like this.  She "wouldn't grab a gun

" but wouldn't mind "not being alive." 


-He feels that if patient had her way, she'd be on no medications and if she 

couldn't feed herself, then "oh well." In the end, she would not want to be 

here. If there were a choice between having a stroke or taking her life....take 

her life.





-Goals of care are to keep her comfortable, cared for, and safe, and let nature 

take its course. No transfer to hospital. 











Impression and Recommendations





- Palliative Care


Impression: 





Pleasant 84-year-old woman with advancing dementia and L hemiparesis following 

a CVA a year ago, originally at [facility], now living at [facility]. She is 

currently relatively stable within a context of steady decline in functionality 

and cognition, increasingly evident in the past month. Family wants her kept 

comfortable with no transfer to hospital. Palliative care will monitor and 

offer support, and transition to Hospice when appropriate.


Recommendations/Counseling Done: 





Dementia without behaviors: Steady decline in cognition and functionality, 

especially over past month.





 L side hemiparesis s/p CVA:  Continue with brace for LUE support. Tylenol 

650mg TID routine for pain control, plus Tylenol as needed.





Constipation: Well controlled, has regular bowel movements. Continue Miralax 

half-dose BID and docusate.





DMII: Controlled on Lantus 10 units daily. BGs checked once a day.





Chronic atrial fibrillation: Continue Coumadin, currently 12.5 mg M,F and 2.5 

mg T,W,Th,S,S. PCP manages dosing.  Not on meds for rate control.





Urinary retention:  Controlled on Tamsulosin and bethanechol.





Weight loss:  4% weight loss in 6 months:  196.6 lbs 2018, 187.8 lbs .  Appetite is good. Patient is on 7.5mg mirtazapine.





R heel pressure wound:  Healing.  Home Health RN manages wound care.





Advanced care planning:   POLST in place, originally signed 2017, reviewed 

today with no changes: DNR and comfort care. Goals of care are to keep her 

comfortable, cared for, and safe, and let nature take its course. No transfer 

to hospital.





Follow up 4-6 weeks and as needed.





Time Spent: 





90 minutes were spent with more than 50% of the time spent on counseling, 

education, and coordination of care.

## 2018-08-30 ENCOUNTER — HOSPITAL ENCOUNTER (OUTPATIENT)
Dept: HOSPITAL 76 - PC | Age: 83
Discharge: HOME | End: 2018-08-30
Attending: NURSE PRACTITIONER
Payer: MEDICARE

## 2018-08-30 DIAGNOSIS — R63.4: ICD-10-CM

## 2018-08-30 DIAGNOSIS — N18.3: ICD-10-CM

## 2018-08-30 DIAGNOSIS — E11.42: ICD-10-CM

## 2018-08-30 DIAGNOSIS — Z99.3: ICD-10-CM

## 2018-08-30 DIAGNOSIS — Z51.5: Primary | ICD-10-CM

## 2018-08-30 DIAGNOSIS — Z66: ICD-10-CM

## 2018-08-30 DIAGNOSIS — E11.22: ICD-10-CM

## 2018-08-30 DIAGNOSIS — I69.354: ICD-10-CM

## 2018-08-30 DIAGNOSIS — F03.90: ICD-10-CM

## 2018-08-30 DIAGNOSIS — Z79.01: ICD-10-CM

## 2018-08-30 DIAGNOSIS — Z79.4: ICD-10-CM

## 2018-08-30 DIAGNOSIS — I12.9: ICD-10-CM

## 2018-08-30 NOTE — CONSULTATION NOTE
Palliative Care Follow Up





- Referral


Referring Provider: Dr Sow


Time of Visit: 8/30/2018.  10:30 - 11:10.


Referral setting: Assisted living (Seen in home setting due to taxing and 

considerable effort required to leave the home due to immobility and wheelchair 

bound from left sided weakness secondary to CVA.)


Referral Reason: Weight loss





- Information Sources


Records reviewed: Previous records reviewed


History/Review of Systems obtained from: Patient, Family, Nursing


Exam limitations: Clinical condition (dementia; short term memory deficits)





- History of Present Illness Update


Brief HPI Update: 





-Pleasant 84-year-old woman with advancing dementia and L hemiparesis following 

a CVA a year ago, originally at [facility], now living at [facility]. Her R heel 

wound is being managed by Home Health nursing.





-Medical history: Dementia without behaviors, CVA with residual L side 

hemiparesis x 1 year; atrial fibrillation on anticoagulation, CKD III, HLD, HTN

, DM II with insulin dependence, diabetic peripheral neuropathy, R heel 

pressure wound Stage III, osteoarthritis.


 


-The patient is sleeping soundly, but was finally roused and able to 

participate in the visit.


-She is pleasant and articulate although a little groggy due to being woken 

from her late-morning nap.


-She has no complaints of pain, including her chronic hip pain.


-The presure wound on R heel is improving but wound care is a challenge due to 

"anticipatory pain" and the patient pulling her foot away during wound care.  


-Nursing finds it easier to do the wound care when patient is sleeping, which 

fortunately occurs frequently.





-Patient has been steadily losing weight, from 203.4 lbs Dc 2017, 196.6 lbs in 

January 2018 to 181.2 lbs on 8/8/18.  That is 7.8% since January, 10.9% since 

December. She is on mirtazapine. Refer to the Palliative Care section for more 

on the weight loss. 








Social History





- Living Situation


Living arrangement: Assisted living ([facility] dementia unit)


Living Situation: With caregiver(s)


Support System: 





Son/DPOA, Melquiades, visits every other week with his wife.  He lives in [place] and is quite attentive. Patient's other son, Joaquim, lives in the 

patient's house but visits infrequently.








Medications/Allergies





- Medications


Home Medications: 


 Ambulatory Orders











 Medication  Instructions  Recorded  Confirmed


 


Acetaminophen 650 mg PO TID PRN 04/18/18 08/31/18


 


Atorvastatin Calcium 20 mg PO QPM 04/18/18 08/31/18


 


Bethanechol Chloride 20 mg PO QID 04/18/18 08/31/18


 


Gabapentin 300 mg PO BID 04/18/18 08/31/18


 


Insulin Glargine [Lantus Solostar] 10 units SQ DAILY 04/18/18 08/31/18


 


Mirtazapine 7.5 mg PO DAILY 04/18/18 08/31/18


 


Polyethylene Glycol 3350 [Miralax] 8.5 oz PO BID 04/18/18 08/31/18


 


Tamsulosin [Flomax] 0.4 mg PO DAILY 04/18/18 08/31/18


 


Acetaminophen 650 mg PO Q6H PRN 06/29/18 08/31/18


 


Docusate Sodium 100 mg PO BID 06/29/18 08/31/18


 


Warfarin Sodium 1.25 mg PO .MON, FRI 06/29/18 08/31/18


 


Warfarin Sodium 2.5 mg PO .T,W,TH,S,S 06/29/18 08/31/18














- Allergies


Allergies/Adverse Reactions: 


 Allergies











Allergy/AdvReac Type Severity Reaction Status Date / Time


 


Sulfa (Sulfonamide Allergy  Unknown Verified 06/03/18 19:57





Antibiotics)     














Review of Systems





- Constitutional


Constitutional: reports: Weight loss (Steady weight loss: 7.8% since Jan 2018, 

10.9% since Dec 2017:   181.2 lbs 8/8/18, 186.4 lbs 7/1/18, 187.8 bs 6/2/18, 

189.4 lbs 4/4/18, 189.4 lbs 3/7/18, 193.6 bs 2//18, 193.4 lbs 1/31/18, 196.6 

lbs 1/11/18, 203.4 lbs 12/14/17.)





- Eyes


Eyes: reports: Vision loss (L side, s/p CVA)





- Ears, Nose & Throat


Ears, Nose & Throat: denies: Hearing loss





- Respiratory


Respiratory: denies: Cough, SOB at rest, SOB with exertion





- Gastrointestinal


Gastrointestinal: denies: Abdominal pain, Nausea





- Genitourinary


Genitourinary: denies: Dysuria, Frequency





- Musculoskeletal


Musculoskeletal: reports: Joint pain (denies pain currently, but son has 

reported chronic pain in bilat hips, on L side due to hemiparesis s/p CVA)





- Neurological


Neurological: reports: Focal weakness (left side hemiparesis)





- Psychiatric


Psychiatric: reports: Depression (on mirtazapine), Anxiety (h/o social anxiety, 

not comfortable in crowds)





- Endocrine


Endocrine: reports: Diabetes type 2, Intolerance to cold (chronic complaints of 

feeling cod)





Physical Exam





- Vital Signs


Temperature: 96.1 F


Pulse Rate: 51


O2 Saturation: 95 (room air)


Blood Pressure: 136/51 (wrist cuff)





- Physical Exam


General Appearance: positive: No acute distress, Other (drowsy due to being 

woken from her morning nap)


Eyes Bilateral: positive: EOMI, No lid inflammation, Conjunctivae nml, No 

scleral icterus


ENT: positive: No signs of dehydration


Neck: positive: Trachea midline


Cardiovascular: positive: Regular rate & rhythm, No murmur, No gallop


Respiratory: positive: Chest non-tender, No respiratory distress


Abdomen: positive: Non-tender, Soft, Nml bowel sounds


Skin: positive: No symptoms, Pressure wound (R heel, managed by Home Health 

nursing, resolving)


Neurologic/Psychiatric: positive: Mood/affect nml, Disoriented to time





Palliative Care





- POLST


Patient has POLST: Yes


POLST Status: DNR, Comfort Measures





- Palliative Care


Discussion: 





Patient reports her quality of life is "so so."  When I asked her what would 

make her life better, she said, "To not be so fat." Had a long discussion with 

patient about her weight and her steady weight loss. Someone (or one's) have 

told her she was fat, and it made her "feel bad." Her mother was on her about 

her weight while she was growing up. At one point she said she needed to lose 

15 lbs. I repeated that she has lost 15-16 pounds, she is not fat and should 

not lose anymore weight. Later she asked how much weight should she lose. I 

said none, and she responded, "That is music to my ears."  It is unclear 

whether her steady weight loss is debility and related to the dementia/

depression, or stems from this lifelong perception of being fat, or both. I 

spoke with the daughter in law prior to my visit, and left a voicemail 

afterwards, about reinforcing the message that she has already lost the weight 

she said she wanted to lose.





Daughter in law noted at the last visit that the patient was at a different 

table in the dining room, with a resident who was more alert and articulate, 

with less advanced dementia. The patient appeared brighter and happier and so 

her daughter-in-law was wondering if the facility could try to match her with 

other residents who are more sociable and able to interact.  





Goals of care have been expressed by family and are to keep her comfortable, 

cared for and safe, let nature take its course, and no transfer to hospital








Impression and Recommendations





- Palliative Care


Impression: 





This is a kishan 84-year-old woman with advancing dementia and left hemiparesis 

S/P CVA 1 year ago living at [facility].  She is being followed by home health 

nursing for a right heel pressure wound which is resolving.  She has had steady 

weight loss of 10% since December, which could be related to a lifelong 

struggle with perceptions of "being fat."  Palliative care will continue to 

provide oversight and support with transition to hospice when criteria are met.





Recommendations/Counseling Done: 





Dementia without behaviors: Stable within a context of steady cognitive and 

functional decline over the past year.  Patient still has a wry sense of humor 

and is articulate.





Weight loss:  Steady weight loss: 7.8% z(15.4 lbs) since Jan 2018, 10.9% (22.2 

lbs) since Dec 2017:   181.2 lbs 8/8/18, 196.6 lbs 1/11/18, 203.4 lbs 12/14/17. 

Patient is on mirtazapine, which can help stimulate appetite, but she also has 

a lifelong perception of being "fat." She stated she needed to lose 15 lbs, and 

was delighted to hear that she had lost that amount, and didn't need to lose 

any more. Asked nursing to monitor and remind her that she "lost weight," and 

family too.





Left-sided hemiparesis S/P CVA: Stable, continue Tylenol 650 3 times daily 

routine for structural pain and Tylenol as needed.





Constipation: controlled with MiraLAX twice daily, half dose.





DM 2: Stable.  Continue Lantus 10 units daily. Fasting BGs range from 80s to 

low 100s.





R heel pressure wound: Resolving. Patient resists wound care when awake,  

nursing is able to provide the wound care most of time while patient is 

sleeping and this is working well. Facility nursing will monitor, if the patient

's resistance becomes a problem, will discuss with ARNP the addition of pain 

medication prior to wound care, but at present, that is not indicated.





Advanced care planning:  Comfort and focus on quality of life. Let nature take 

its course, no transfer to hospital.





Follow up 4-6 weeks and as needed.


Time Spent: 





40 minutes were spent with more than 50% of this time spent in counseling, 

education, and coordination of care.

## 2018-09-07 ENCOUNTER — HOSPITAL ENCOUNTER (OUTPATIENT)
Dept: HOSPITAL 76 - PC | Age: 83
Discharge: HOME | End: 2018-09-07
Attending: NURSE PRACTITIONER
Payer: MEDICARE

## 2018-09-07 DIAGNOSIS — Z79.4: ICD-10-CM

## 2018-09-07 DIAGNOSIS — E11.22: ICD-10-CM

## 2018-09-07 DIAGNOSIS — R63.4: ICD-10-CM

## 2018-09-07 DIAGNOSIS — Z79.01: ICD-10-CM

## 2018-09-07 DIAGNOSIS — N18.3: ICD-10-CM

## 2018-09-07 DIAGNOSIS — Z66: ICD-10-CM

## 2018-09-07 DIAGNOSIS — Z99.3: ICD-10-CM

## 2018-09-07 DIAGNOSIS — Z51.5: Primary | ICD-10-CM

## 2018-09-07 DIAGNOSIS — F03.90: ICD-10-CM

## 2018-09-07 DIAGNOSIS — I12.9: ICD-10-CM

## 2018-09-07 DIAGNOSIS — I69.354: ICD-10-CM

## 2018-12-20 ENCOUNTER — HOSPITAL ENCOUNTER (OUTPATIENT)
Dept: HOSPITAL 76 - PC | Age: 83
Discharge: HOME | End: 2018-12-20
Attending: NURSE PRACTITIONER
Payer: MEDICARE

## 2018-12-20 DIAGNOSIS — I69.954: ICD-10-CM

## 2018-12-20 DIAGNOSIS — E11.42: ICD-10-CM

## 2018-12-20 DIAGNOSIS — I12.9: ICD-10-CM

## 2018-12-20 DIAGNOSIS — I48.0: ICD-10-CM

## 2018-12-20 DIAGNOSIS — Z79.4: ICD-10-CM

## 2018-12-20 DIAGNOSIS — Z51.5: Primary | ICD-10-CM

## 2018-12-20 DIAGNOSIS — R15.9: ICD-10-CM

## 2018-12-20 DIAGNOSIS — F03.90: ICD-10-CM

## 2018-12-20 DIAGNOSIS — E11.22: ICD-10-CM

## 2018-12-20 DIAGNOSIS — Z79.01: ICD-10-CM

## 2018-12-20 DIAGNOSIS — N18.3: ICD-10-CM

## 2018-12-20 DIAGNOSIS — Z66: ICD-10-CM

## 2018-12-20 DIAGNOSIS — Z99.3: ICD-10-CM

## 2018-12-20 DIAGNOSIS — R63.4: ICD-10-CM

## 2018-12-20 DIAGNOSIS — M19.90: ICD-10-CM

## 2018-12-20 DIAGNOSIS — R32: ICD-10-CM

## 2018-12-20 NOTE — CONSULTATION NOTE
Palliative Care Follow Up





- Referral


Referring Provider: Dr Sow


Time of Visit: 12/20/2018.  10:00 - 10:15


Referral setting: Assisted living (Seen in home setting due to taxing and 

considerable effort required to leave the home due to immobility and wheelchair 

bound from left sided weakness secondary to CVA.)


Referral Reason: Dementia, declining functionality





- Information Sources


Records reviewed: RN notes reviewed


History/Review of Systems obtained from: Family, Nursing


Exam limitations: Clinical condition (dementia; lethargy/somnolence)





- History of Present Illness Update


Brief HPI Update: 





-Pleasant 84-year-old woman with advancing dementia and declining functionality,

with L hemiparesis following a CVA a year ago. She lives at [facility].





-Medical history: Dementia without behaviors, CVA with residual L side 

hemiparesis x 1 year; atrial fibrillation on anticoagulation, CKD III, HLD, HTN,

DM II with insulin dependence, diabetic peripheral neuropathy, R heel pressure 

wound Stage III, osteoarthritis.





Patient has continued to decline cognitively and functionally.


She spends much of her time sleeping either in her wheelchair or in her bed.


Staff did report she sang along with Nik paredes this week, a departure 

from her usual listlessness.


She appears depressed; on good days she talks to people, but she has more bad 

days than good days per staff.


She doesn't usually participate in activities, she falls asleep.





During today's visit she was upright in her tilt-in-space wheelchair, somnolent,

eyes remained shut.


She provided only minimal response to my questions and conversation.


She did rouse and open her eyes when a familiar caretaker encouraged her to 

respond.


Otherwise she remained somnolent and non-participatory.





Patient was DC'd 11/30/18 from  RN service: coccyx and heel wounds resolved.








Social History





- Living Situation


Living arrangement: Assisted living ([facility])


Living Situation: With caregiver(s)


Support System: 





Son/DPOAMelquiades, visits regularly with his wife, they live in [place], [place]. 

Patient's other son, Joaquim, lives in the patient's house.








Medications/Allergies





- Medications


Home Medications: 


                                Ambulatory Orders











 Medication  Instructions  Recorded  Confirmed


 


Acetaminophen 650 mg PO TID PRN 04/18/18 12/20/18


 


Atorvastatin Calcium 20 mg PO QPM 04/18/18 12/20/18


 


Bethanechol Chloride 20 mg PO QID 04/18/18 12/20/18


 


Gabapentin 300 mg PO BID 04/18/18 12/20/18


 


Insulin Glargine [Lantus Solostar] 10 units SQ DAILY 04/18/18 12/20/18


 


Mirtazapine 7.5 mg PO DAILY 04/18/18 12/20/18


 


Polyethylene Glycol 3350 [Miralax] 8.5 oz PO BID 04/18/18 12/20/18


 


Tamsulosin [Flomax] 0.4 mg PO DAILY 04/18/18 12/20/18


 


Acetaminophen 650 mg PO Q6H PRN 06/29/18 12/20/18


 


Docusate Sodium 100 mg PO BID 06/29/18 12/20/18


 


Warfarin Sodium 2.5 mg PO .T,W,TH,F,S,S 06/29/18 12/20/18


 


Warfarin Sodium 5 mg PO .MON 06/29/18 12/20/18














- Allergies


Allergies/Adverse Reactions: 


                                    Allergies











Allergy/AdvReac Type Severity Reaction Status Date / Time


 


Sulfa (Sulfonamide Allergy  Unknown Verified 06/03/18 19:57





Antibiotics)     














Review of Systems





- Constitutional


Constitutional: reports: Fatigue, Weight loss (175.2 lbs 12/5/18.  169.8 lbs 

11/3/18.  186 lbs 10/6/18. 183.4 lbs 9/4/18.  181.2 lbs 8/8/18, 186.4 lbs 

7/1/18, 187.8 bs 6/2/18, 189.4 lbs 4/4/18, 189.4 lbs 3/7/18, 193.6 bs 2//18, 

193.4 lbs 1/31/18, 196.6 lbs 1/11/18, 203.4 lbs 12/14/17.)





- Eyes


Eyes: reports: Vision loss (L side s/p CVA)





- Ears, Nose & Throat


Ears, Nose & Throat: denies: Hearing loss





- Cardiovascular


Cardiovascular: denies: Edema





- Respiratory


Respiratory: denies: Cough





- Gastrointestinal


Gastrointestinal: denies: Constipation





- Genitourinary


Genitourinary: reports: Incontinence (bowel and bladder)





- Musculoskeletal


Musculoskeletal: reports: Assistive devices (tilt in space wheelchair), Transfer

 issues, Other (can't hold torso erect without support)





- Neurological


Neurological: reports: Memory problems





- Psychiatric


Psychiatric: reports: Depression





- Other Findings


Other Findings: 





Limited ROS, patient somnolent. Information from caregivers.





Physical Exam





- Vital Signs


Temperature: 96.6 F


Pulse Rate: 58


O2 Saturation: 99


Blood Pressure: 153/66





- Physical Exam


General Appearance: positive: No acute distress, Lethargic


Eyes Bilateral: positive: No lid inflammation, Conjunctivae nml, No scleral 

icterus


ENT: positive: No signs of dehydration


Neck: positive: Trachea midline


Cardiovascular: positive: Regular rate & rhythm, No murmur, No gallop


Respiratory: positive: Chest non-tender, No respiratory distress, Diminished 

throughout (body habitus)


Abdomen: positive: Non-tender, Nml bowel sounds, Abnml bowel sounds


Skin: positive: No symptoms


Extremities: positive: Nml appearance, No pedal edema


Neurologic/Psychiatric: positive: Disoriented to place, Disoriented to time, 

Flat affect





Palliative Care





- POLST


Patient has POLST: Yes


POLST Status: DNR, Comfort Measures


Performance Status: 





Jt lift for transfers


Tilt in space wheelchair, unable to hold torso erect


Excessive sleeping


Incontinent bowel and bladder








- Palliative Care


Discussion: 





Patient is steadily declining.  She is sleeping or lethargic most times that I 

see her. In August she spoke with me, and at the Sept visit as well. This visit 

I didn't succeed in rousing her or getting her involved in the assessment.





Her son Melquiades has previously stated that "she would never want to be like 

this."





His stated goals of care are to keep her comfortable, cared for and safe, and 

allow nature to take its course.  He does not want her transferred to hospital.


I did speak very briefly with his wife who was at work when I called, providing 

her the update and that patient is stable although declining. 








Impression and Recommendations





- Palliative Care


Impression: 





84-year-old woman with advancing dementia and declining functionality with left 

hemiparesis S/P CVA one year ago.   nursing DC'd her 11/30/18 from wound care.

 Palliative care will continue to provide oversight and support, with transition

 to hospice when criteria are met. Family does not want her transferred to 

Hospital.





Recommendations/Counseling Done: 





Left-sided hemiparesis S/P CVA: Tilt-in-space wheelchair for immobility s/p CVA.

 





Dementia without behaviors: Continued slow and steady cognitive, functional 

decline over the past year. Patient somnolent today, and sleeps most of the 

time. She was enjoying the [employer] earlier this week..





Weight loss:  Current weight is 175.2 lbs on 12/5/18.  A year ago she weighed 

203.4 lbs, she's steadily declined since then and dropped as low as 169.8 lbs on

 11/3/18.





DM 2: Stable on Lantus 10 units daily. Fasting BGs range from 80s to low 100s, 

today was 96.





Sacral Stage I pressure wound on coccyx and R heel wound: Resolved, Home Health 

RN discharged her on 11/30/18.  





Toenail loss: Both great toenails fell off, she was seen by podiatrist, they are

 growing back.





Advanced care planning:  Comfort and focus on quality of life. No transfer to 

hospital.





Follow up as needed.





Time Spent: 





15 minutes were spent with more than 50% of the time spent on counseling and 

coordination of care.

## 2019-02-04 ENCOUNTER — HOSPITAL ENCOUNTER (OUTPATIENT)
Dept: HOSPITAL 76 - PC | Age: 84
Discharge: HOME | End: 2019-02-04
Attending: NURSE PRACTITIONER
Payer: MEDICARE

## 2019-02-04 DIAGNOSIS — R32: ICD-10-CM

## 2019-02-04 DIAGNOSIS — Z79.899: ICD-10-CM

## 2019-02-04 DIAGNOSIS — Z66: ICD-10-CM

## 2019-02-04 DIAGNOSIS — L89.152: ICD-10-CM

## 2019-02-04 DIAGNOSIS — F32.9: ICD-10-CM

## 2019-02-04 DIAGNOSIS — Z79.01: ICD-10-CM

## 2019-02-04 DIAGNOSIS — Z79.4: ICD-10-CM

## 2019-02-04 DIAGNOSIS — B02.9: ICD-10-CM

## 2019-02-04 DIAGNOSIS — I69.319: ICD-10-CM

## 2019-02-04 DIAGNOSIS — E11.22: ICD-10-CM

## 2019-02-04 DIAGNOSIS — F01.50: ICD-10-CM

## 2019-02-04 DIAGNOSIS — I69.334: ICD-10-CM

## 2019-02-04 DIAGNOSIS — Z51.5: Primary | ICD-10-CM

## 2019-02-04 DIAGNOSIS — N18.3: ICD-10-CM

## 2019-02-04 DIAGNOSIS — R63.4: ICD-10-CM

## 2019-02-04 DIAGNOSIS — E11.42: ICD-10-CM

## 2019-02-04 DIAGNOSIS — Z99.3: ICD-10-CM

## 2019-02-04 DIAGNOSIS — I12.9: ICD-10-CM

## 2019-02-04 DIAGNOSIS — I48.91: ICD-10-CM

## 2019-02-11 ENCOUNTER — HOSPITAL ENCOUNTER (OUTPATIENT)
Dept: HOSPITAL 76 - PC | Age: 84
Discharge: HOME | End: 2019-02-11
Attending: NURSE PRACTITIONER
Payer: MEDICARE

## 2019-02-11 DIAGNOSIS — B02.9: ICD-10-CM

## 2019-02-11 DIAGNOSIS — I69.959: ICD-10-CM

## 2019-02-11 DIAGNOSIS — Z66: ICD-10-CM

## 2019-02-11 DIAGNOSIS — E11.42: ICD-10-CM

## 2019-02-11 DIAGNOSIS — I69.911: ICD-10-CM

## 2019-02-11 DIAGNOSIS — N18.3: ICD-10-CM

## 2019-02-11 DIAGNOSIS — L89.609: ICD-10-CM

## 2019-02-11 DIAGNOSIS — E11.22: ICD-10-CM

## 2019-02-11 DIAGNOSIS — R63.4: ICD-10-CM

## 2019-02-11 DIAGNOSIS — Z51.5: Primary | ICD-10-CM

## 2019-02-11 DIAGNOSIS — Z79.01: ICD-10-CM

## 2019-02-11 DIAGNOSIS — L89.152: ICD-10-CM

## 2019-02-11 DIAGNOSIS — I48.0: ICD-10-CM

## 2019-02-11 DIAGNOSIS — I12.9: ICD-10-CM

## 2019-02-11 NOTE — CONSULTATION NOTE
Palliative Care Follow Up





- Referral


Referring Provider: Dr. Cristiano Sow


Time of Visit: [id number]


Referral setting: Assisted living (seen at [facility])


Referral Reason: Herpes Zoster/Dementia





- Information Sources


Records reviewed: Previous records reviewed


History/Review of Systems obtained from: Caregiver


Exam limitations: Clinical condition (patient with advanced dementia)





- History of Present Illness Update


Brief HPI Update: 


This is an 85-year-old woman with advancing dementia, attributed to her CVA with

left hemiparesis paresis almost 2 years ago.  Patient is continued to 

deteriorate, she presented last Saturday with complaints of itching, Sunday had 

a outbreak of vesicular rash.  She has been on valacyclovir 1000 mg 3 times 

daily, I am seeing her in follow-up to make sure it is healing.  Patient tends 

to scratch and did get at her wounds, currently though patient without any signs

or symptoms of infection.  Does appear to be healing.  Most vesicles on the back

are dried, small scabbed area on front.  Patient has been on contact 

precautions, is isolated in her room.  They are bringing meals to her, but not 

getting her up in her wheelchair as much.  She is continued to decline 

cognitively, remains withdrawn, she is been sleeping more.  She has had less 

intake, she is tolerating being off her insulin.  Her blood sugars continue in 

normal range of .





Patient's past medical history includes atrial fib on anti-coag, CKD stage III, 

hyperlipidemia, hypertension, diabetes type 2, diabetic peripheral neuropathy, 

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  Today 

her complaint is also of tailbone pain, though her stage II decub has filled in 

since last visit.








Social History





- Living Situation


Living arrangement: Assisted living


Living Situation: Alone


Support System: 


Son Melquiades is a D POA, his wife Krystin a visit on a regular basis.








Medications/Allergies





- Medications


Home Medications: 


                                Ambulatory Orders











 Medication  Instructions  Recorded  Confirmed


 


Acetaminophen 650 mg PO TID 04/18/18 02/12/19


 


Atorvastatin Calcium 20 mg PO QPM 04/18/18 02/12/19


 


Bethanechol Chloride 20 mg PO QID 04/18/18 02/12/19


 


Gabapentin 300 mg PO BID 04/18/18 02/12/19


 


Mirtazapine 15 mg PO DAILY PM 04/18/18 02/12/19


 


Polyethylene Glycol 3350 [Miralax] 8.5 oz PO BID 04/18/18 02/12/19


 


Tamsulosin [Flomax] 0.4 mg PO DAILY 04/18/18 02/12/19


 


Acetaminophen 650 mg PO Q6H PRN MDD 3000 mg 06/29/18 02/12/19


 


Warfarin Sodium 2.5 mg PO .T,W,TH,F,S,S 06/29/18 02/12/19


 


Warfarin Sodium 5 mg PO .MON 06/29/18 02/12/19


 


Valacyclovir HCl [Valacyclovir] 1,000 mg PO TID MDD 7 days 02/05/19 02/12/19














- Allergies


Allergies/Adverse Reactions: 


                                    Allergies











Allergy/AdvReac Type Severity Reaction Status Date / Time


 


Sulfa (Sulfonamide Allergy  Unknown Verified 06/03/18 19:57





Antibiotics)     














Review of Systems





- Constitutional


Constitutional: reports: Fatigue, Poor appetite, Other (requested weight; 

patient appears to have continued decreased intake)





- Eyes


Eyes: reports: Vision loss





- Ears, Nose & Throat


Ears, Nose & Throat: reports: Hearing loss, Dry mouth (patient snores loudly; 

some periods of apnea; staff report this has been patients norm for at least the

 year)





- Cardiovascular


Cardiovascular: reports: Irregular heart rate





- Respiratory


Respiratory: denies: SOB at rest





- Gastrointestinal


Gastrointestinal: reports: Diarrhea, Poor appetite, Early satiety





- Genitourinary


Genitourinary: reports: Incontinence (strong smelling uringe)





- Musculoskeletal


Musculoskeletal: reports: Stiffness (left sided), Muscle weakness, Transfer 

issues (use aj for transfer, feeding in bed)





- Integumentary


Integumentary: reports: Lesions (herpes zoster/shingles right thoracic area-few 

scattered lesions front), Dryness





- Neurological


Neurological: reports: General weakness, Memory problems (some replies; less 

verbal and more lethargic today)





Physical Exam





- Vital Signs


Temperature: 96.7 C


Pulse Rate: 72


Respiratory Rate: 18


O2 Saturation: 94 (ra @ rest)


Blood Pressure: 110/72





- Physical Exam


General Appearance: positive: Lethargic


Eyes Bilateral: positive: Normal inspection


ENT: positive: Dry mucous membranes (patient snores/mouth breathes; oral care 

items in room)


Neck: positive: No JVD, Trachea midline


Cardiovascular: positive: Irregular


Respiratory: positive: Diminished in bases.  negative: Wheezes, Rales, Rhonchi


Abdomen: positive: Soft


Skin: positive: Pallor, Dryness, Pressure wound (coccyx pink)


Extremities: positive: No pedal edema


Neurologic/Psychiatric: positive: Disoriented to person, Disoriented to place, 

Disoriented to time, Weakness, Flat affect





Palliative Care





- POLST


Patient has POLST: Yes


POLST Status: DNR, Comfort Measures


Pain: Pain unchanged, Location (left sided discomfort with movement)


Tiredness/Fatigue: Moderate (4-6)


Drowsiness/Sedation: Moderate (4-6)


Performance Status: 


Patient has been less interactive, needing more assistance with feeding.  

Patient is moved mostly bedbound this last week, does have a tilt in space 

wheelchair, is in isolation.








Impression and Recommendations





- Palliative Care


Impression: 


This is a 85-year-old woman who presented last week with herpes zoster, was 

started on valacyclovir, lesions are improving.  Patient continues to decline, 

more lethargic, and sleeping more.Care providing support, will continue to 

monitor for transition to hospice and focus on quality of life issues.





Recommendations/Counseling Done: 


1.Herpes zoster/shingles.  Patient is responding to valacyclovir, vesicles are 

drying, no signs or symptoms of infection.  Did appear to be kept clean and dry,

 are covered currently as there is still some moisture.  Patient does not 

present with any acute pain, does not seem to be uncomfortable with the lesions.





2.  Diabetes type 2.  Patient tolerating discontinuation of Lantus, without any 

symptoms of increased blood sugars.  Will discontinue a.m. blood sugars next 

week if continue within normal range.





3.  Sacral stage II pressure wound on coccyx.  This does appear still quite 

uncomfortable for her, she is on a pressure relief mattress but does appear to 

be healing and filled in.  They are using barrier cream for management.





4.  Weight loss.  Patient does not appear to have increased intake, continues to

 decline, with increased sedation, anorexia, and ongoing decreased intake, now 

requiring being fed.


 


 





Time Spent: 


15 minutes with 50% of this done in coordination of care with clinical staff, 

follow-up on care plan, requesting old bowel meds if patient having loose 

stools.  Plan for next visit readdress goals of care, Coumadin dosing, and 

continue to evaluate for transition to hospice.

## 2019-02-12 ENCOUNTER — HOSPITAL ENCOUNTER (OUTPATIENT)
Dept: HOSPITAL 76 - PC | Age: 84
Discharge: HOME | End: 2019-02-12
Attending: NURSE PRACTITIONER
Payer: MEDICARE

## 2019-02-12 DIAGNOSIS — R63.4: ICD-10-CM

## 2019-02-12 DIAGNOSIS — E11.42: ICD-10-CM

## 2019-02-12 DIAGNOSIS — I12.9: ICD-10-CM

## 2019-02-12 DIAGNOSIS — E11.22: ICD-10-CM

## 2019-02-12 DIAGNOSIS — R63.0: ICD-10-CM

## 2019-02-12 DIAGNOSIS — R19.7: ICD-10-CM

## 2019-02-12 DIAGNOSIS — R32: ICD-10-CM

## 2019-02-12 DIAGNOSIS — N18.3: ICD-10-CM

## 2019-02-12 DIAGNOSIS — J18.9: ICD-10-CM

## 2019-02-12 DIAGNOSIS — I48.0: ICD-10-CM

## 2019-02-12 DIAGNOSIS — I69.959: ICD-10-CM

## 2019-02-12 DIAGNOSIS — I69.911: ICD-10-CM

## 2019-02-12 DIAGNOSIS — Z66: ICD-10-CM

## 2019-02-12 DIAGNOSIS — Z79.891: ICD-10-CM

## 2019-02-12 DIAGNOSIS — L89.151: ICD-10-CM

## 2019-02-12 DIAGNOSIS — Z51.5: Primary | ICD-10-CM

## 2019-02-12 DIAGNOSIS — B02.9: ICD-10-CM

## 2019-02-12 NOTE — CONSULTATION NOTE
Palliative Care Follow Up





- Referral


Referring Provider: Dr. Cristiano Sow


Time of Visit: 


Referral setting: Assisted living (patient seen at memory care unit; patient is 

bedbound/w/c it is a taxing and considerable effort for her to leave the 

facility)


Referral Reason: Pneumonia/FTT/Goals of Care





- Information Sources


Records reviewed: RN notes reviewed, Previous records reviewed


History/Review of Systems obtained from: Patient, Caregiver (clinical staff)


Exam limitations: Clinical condition (patient with advanced dementia)





- History of Present Illness Update


Brief HPI Update: 


This is an 85-year-old woman with advancing dementia, this is attributed to her 

CVA with left hemiparesis almost 2 years ago.  She does have change of 

condition, she has been deteriorating fairly rapidly for the last couple weeks. 

She presented with shingles on 2/4.  Unfortunately this meant she was confined 

to her room because of precautions, patient is usually up for meals, with more 

interaction.  She had been fed in her room.  She has been sleeping more, was 

notified by facility that she had a change in condition with increased 

somnolence, cough, noisy respirations, as well as poor intake.  Patient's was 

seen previous day, with some lethargy, decreased breath sounds in bases, but no 

respiratory distress or cough at that time. Agreed to see patient urgently.





They had patient up to be able to weigh her, she was on 2/5  165 pounds, today 

she is 159.2.  She does present with a moist cough, crackles in the bases, 

scattered rhonchi anteriorly.  Staff reports she has been having watery 

diarrhea, though they have continued to give her MiraLAX. She is quite fatigued,

she was difficult to arouse this morning, she did not eat any breakfast.  She is

able to make eye contact, but after patient settled back in bed, went back to 

sleep.  She is complaining it seems of tailbone pain, no open area, but quite 

pink, patient does have a history of a decub suspect there.  Patient is quite 

stiff, seems uncomfortable with repositioning.  Remains stiff and and painful on

her left side with movement. Her O2 sats are 90%, though she does not appear in 

respiratory distress at this point in time.  Her temp is 97.3, pulse 72, blood 

pressure elevated at 172/88. Patient presents with symptoms most likely 

community-acquired pneumonia, staff report patient has not noted any dysphagia 

or choking, though with patient's management of her secretions it could be 

aspiration pneumonia as well.














Social History





- Living Situation


Living arrangement: Assisted living


Support System: 


Son and DPO Melquiades Zamorano do oversee patient's care, are decision makers 

regarding medical decisions his wife Krystin is involved as well.








Medications/Allergies





- Medications


Home Medications: 


                                Ambulatory Orders











 Medication  Instructions  Recorded  Confirmed


 


Acetaminophen 650 mg PO TID 04/18/18 02/12/19


 


Bethanechol Chloride 20 mg PO QID 04/18/18 02/12/19


 


Gabapentin 300 mg PO BID 04/18/18 02/12/19


 


Mirtazapine 15 mg PO DAILY PM 04/18/18 02/12/19


 


Polyethylene Glycol 3350 [Miralax] 8.5 oz PO .HOLD 04/18/18 02/12/19


 


Tamsulosin [Flomax] 0.4 mg PO DAILY 04/18/18 02/12/19


 


Acetaminophen 650 mg PO Q6H PRN MDD 3000 mg 06/29/18 02/12/19


 


Valacyclovir HCl [Valacyclovir] 1,000 mg PO TID MDD last day 02/05/19 02/12/19


 


Guaifenesin [Tussin] 10 - 20 ml PO Q4HR PRN 02/12/19 02/12/19


 


LORazepam [Ativan] 0.5 mg PO Q6HR PRN 02/12/19 02/12/19


 


Morphine Sulfate [Morphine Sulf 2.5 - 5 mg PO .Q2-4 HOURS PRN 02/12/19 02/12/19





Oral (Roxanol)]   














- Allergies


Allergies/Adverse Reactions: 


                                    Allergies











Allergy/AdvReac Type Severity Reaction Status Date / Time


 


Sulfa (Sulfonamide Allergy  Unknown Verified 06/03/18 19:57





Antibiotics)     














Review of Systems





- Constitutional


Constitutional: reports: Fatigue, Poor appetite, Weight loss (159.2 9 % weight 

loss since 12/05).  denies: Fever





- Eyes


Eyes: reports: Vision loss





- Ears, Nose & Throat


Ears, Nose & Throat: reports: Dry mouth





- Cardiovascular


Cardiovascular: reports: Irregular heart rate.  denies: Edema





- Respiratory


Respiratory: reports: Cough (moist cough), Wheezing, Snoring





- Gastrointestinal


Gastrointestinal: reports: Diarrhea (patient with frequent stooling last several

days;), Poor appetite





- Genitourinary


Genitourinary: reports: Incontinence





- Musculoskeletal


Musculoskeletal: reports: Stiffness (left side), Limited range of motion, Muscle

weakness, Transfer issues (uses aj lift; has tilt in space 

wheelchair/hospital bed pressure relief mattress)





- Integumentary


Integumentary: reports: Lesions, Dryness





- Neurological


Neurological: reports: General weakness, Memory problems, Slurred speech





- Psychiatric


Psychiatric: reports: Depression, Anxiety





- All Other Systems


All Other Systems: reports: Other (limited ROS)





Physical Exam





- Vital Signs


Temperature: 97.3 C


Pulse Rate: 72


Respiratory Rate: 20


O2 Saturation: 90 (ra @ rest)


Blood Pressure: 172/88





- Physical Exam


General Appearance: positive: Mild distress, Lethargic


ENT: positive: Dry mucous membranes (patient with dry fissures on tongue; 

patient breaths through mouth/snores)


Neck: positive: Trachea midline


Cardiovascular: positive: Irregularly irregular


Respiratory: positive: Diminished in bases, Rhonchi (scattered anteriorly), 

Other (moist cough)


Abdomen: positive: Soft, Tenderness (with palpation)


Skin: positive: Pallor, Pressure wound (thinned red coccyx/sacrum; heels intact 

though puts significant pressure on them)


Extremities: positive: No pedal edema


Neurologic/Psychiatric: positive: Disoriented to person, Disoriented to place, 

Disoriented to time, Weakness





Palliative Care





- POLST


Patient has POLST: Yes


POLST Status: DNR, Comfort Measures


Pain: Location (left sided central pain stroke; tender to touch and turning)





- Palliative Care


Discussion: 


Family conference done on phone with Melquiades Zamorano son/D POA and Krystin Zamorano 

daughter-in-law. Had talked to Krystin from patient's room at facility, regarding 

goals of care.  Presented the options, she is going to share them with Melquiades 

and have Melquiades call me when he got home.  Reviewed the options again together 

on speaker phone, regarding patient could be treated with antibiotics, most 

likely has community-acquired/aspiration pneumonia and see if patient responded.

 Patient is having increased difficulty with swallowing, decreased intake, may 

or may not respond positively.  Option to send her to the hospital for more 

aggressive intervention and treatment, or if consistent with goals of care, and 

focus of comfort, can choose not to treat, discontinue medications at the than 

those needed for comfort and transition to hospice.  Melquiades appropriately 

tearful, reviewed as far as mother's quality of life, has continued to 

deteriorate, felt like she has mentally taken a dive over the last couple weeks,

we did discuss in the context of her shingles, weight loss, cognitive and 

functional decline, most likely would only be extending her suffering if she was

to respond at all.  In the context of this decision was made to transition to 

hospice.  Did ask about her other son Joaquim.  Feels like he is significantly more

emotionally fragile, they will talk to him, but he is not involved in the 

decision-making.  Update and consult with hospice medical director Dr. Cabral, 

have agreed to take her, even if they had picked him to continue on with 

antibiotics.  Discussion was made weighing benefits of burdens regarding her 

current interventions, will discontinue the Coumadin, protimes, atorvastatin, 

and initiate comfort medications.  Comfort medications were reviewed which 

include Lorazepam and morphine for respiratory distress, they did verbalize 

understanding








Impression and Recommendations





- Palliative Care


Impression: 


This is an 85-year-old woman with advanced dementia, with multiple comorbidities

including atrial fib, CKD stage III, hypertension, diabetes, CVA with left 

hemiparesis, and osteoarthritis.  She presented last week with herpes zoster, 

today she presents with signs and symptoms of community-acquired pneumonia.  

Given patient's ongoing functional and cognitive decline, family conference over

phone was provided, decision has been made to transition to hospice and focus on

comfort.





Recommendations/Counseling Done: 


1. Community-acquired pneumonia.  Prescription written for guaifenesin to assist

with cough, patient does have moist cough does seem bothersome to patient.  

Prescription also written for morphine 2.5-5 mg every 2 to 4 hours for 

respiratory distress.  Oxygen currently satting at 90% down from 94% yesterday. 

Follow-up with hospice requested oxygen for comfort, patient is a mouth 

breather, patient is not in respiratory distress at time of visit.  Decision has

been made to transition to comfort measures.





2.  Diarrhea.  Most likely culprit is valacyclovir, though does not list as a 

side effect.  Have discontinued MiraLAX and do good thought which were being 

continued to be given.





3.  Anorexia/weight loss.  This is multifactorial in origin.  Given patient's 

lethargy and decreased responsiveness, orders were written for offering food and

fluid for comfort, hold medications if patient unable to swallow safely.





4.  Urinary incontinence.  Reviewed with staff, patient has had longtime history

of urinary retention, will leave medications of tamsulosin and bethanechol 

chloride as long as patient still able to swallow.  Patient may require Gonzalez 

catheter for management for end of life.





5.  Stage I coccyx and sacrum.  Patient has been mostly bedbound for the last 

week, patient is quite stiff and often wiggles after repositioning.  Patient is 

on a pressure relief mattress, will need continuing monitoring.





6.  Advanced care planning family conference regarding goals of care, decision 

is made to focus on comfort, nonessential medications were discontinued, 

referral to hospice made via primary care provider Dr. Sow has been 

notified of patient's decline and family's wishes.  Counseling provided regardi

 hospice support in facility, follow-up with  regarding plan of

care and family's decision, orders written for comfort medications.  Hospice 

referral pending, at this point in time is scheduled for Thursday.





Time Spent: 


Time spent 60 minutes with greater than 50% of this done in counseling and 

coordination of care with staff, evaluation and clarification of goals of care 

as well as coordination with primary care provider and hospice team

## 2024-01-26 NOTE — CONSULTATION NOTE
Palliative Care Follow Up





- Referral


Referring Provider: Dr. Cristiano Sow


Time of Visit: [id number]


Referral setting: Assisted living


Referral Reason: Shingles/Dementia





- Information Sources


Records reviewed: Previous records reviewed


History/Review of Systems obtained from: Caregiver (facility staff;)


Exam limitations: Clinical condition (patient with advanced dementia)





- History of Present Illness Update


Brief HPI Update: 


This is an 85-year-old woman with advancing dementia, attributed to her CVA with

left hemiparesis almost 2 years ago.  She presented on Saturday with some 

complaints of itching, Sunday had an outbreak of a vesicular rash, mostly 

involving the right upper thoracic back area, with several large areas of 

converging moist areas 3 X 5 cm,  with a few satellite lesions across her right 

breast.  She denies any pain or distress.  She reports she often gets "moisture"

under her breasts with with her "bra", has picked at larger vesicule with 

scabbed 1 cm area. Some erythema. It does follow a dermatome on pattern, and has

erupted in less than 72 hours, will go ahead and treat with valacyclovir.





Patient has continued to decline cognitively, is more withdrawn, daughter-in-law

reports did not recognize her though did recognize her son.  She is able to 

engage in conversation, though is thinking she is in her mother's house.  She 

has been sleeping more.  She has had no acute infections recently.  Awaiting 

correct weight, patient has had slow weight loss over the last several months.





Patient's past medical history includes atrial fib on anti-coag, CKD stage III, 

hyperlipidemia, hypertension, diabetes type 2 on very low dose of insulin, 

diabetic peripheral neuropathy on gabapentin, and osteoarthritis.  Patient's 

been seen in the past by home health RN for stage III decub on her right heel, 

now closed





Social History





- Living Situation


Living arrangement: Assisted living (Patient lives in [facility] 

Unit)


Support System: 


Son Melquiades and his wife Krystin visit her on a regular basis, and do oversee her 

care.








Medications/Allergies





- Medications


Home Medications: 


                                Ambulatory Orders











 Medication  Instructions  Recorded  Confirmed


 


Acetaminophen 650 mg PO TID 04/18/18 02/05/19


 


Atorvastatin Calcium 20 mg PO QPM 04/18/18 02/05/19


 


Bethanechol Chloride 20 mg PO QID 04/18/18 02/05/19


 


Gabapentin 300 mg PO BID 04/18/18 02/05/19


 


Insulin Glargine [Lantus Solostar] 10 units SQ DAILY 04/18/18 02/05/19


 


Mirtazapine 7.5 mg PO DAILY 04/18/18 02/05/19


 


Polyethylene Glycol 3350 [Miralax] 8.5 oz PO BID 04/18/18 02/05/19


 


Tamsulosin [Flomax] 0.4 mg PO DAILY 04/18/18 02/05/19


 


Acetaminophen 650 mg PO Q6H PRN MDD 3000 mg 06/29/18 02/05/19


 


Docusate Sodium 100 mg PO BID 06/29/18 02/05/19


 


Warfarin Sodium 2.5 mg PO .T,W,TH,F,S,S 06/29/18 02/05/19


 


Warfarin Sodium 5 mg PO .MON 06/29/18 02/05/19


 


Valacyclovir HCl [Valacyclovir] 1,000 mg PO TID MDD 7 days 02/05/19 02/05/19














- Allergies


Allergies/Adverse Reactions: 


                                    Allergies











Allergy/AdvReac Type Severity Reaction Status Date / Time


 


Sulfa (Sulfonamide Allergy  Unknown Verified 06/03/18 19:57





Antibiotics)     














Review of Systems





- Constitutional


Constitutional: reports: Fatigue.  denies: Fever





- Eyes


Eyes: reports: Vision loss





- Ears, Nose & Throat


Ears, Nose & Throat: reports: Hearing loss





- Gastrointestinal


Gastrointestinal: denies: Constipation





- Genitourinary


Genitourinary: reports: Incontinence





- Musculoskeletal


Musculoskeletal: reports: Limited range of motion (LUE hemiplegia), Muscle 

weakness, Transfer issues (jt lift transfer into tilt in space wheelchair)





- Integumentary


Integumentary: reports: Rash (c/o itching Saturday; rash noted Sunday), Dryness,

 Other (Coccyx with pressure sore)





- Neurological


Neurological: reports: General weakness, Memory problems





- Psychiatric


Psychiatric: reports: Depression, Delusions





- Endocrine


Endocrine: reports: Diabetes type 2





- All Other Systems


All Other Systems: reports: Other (limited ROS)





Physical Exam





- Vital Signs


Temperature: 97.0 C


Pulse Rate: 50


Respiratory Rate: 18


O2 Saturation: 95 (ra @ rest)


Blood Pressure: 132/72





- Physical Exam


General Appearance: positive: Mild distress, Anxious


Eyes Bilateral: positive: Normal inspection


Neck: positive: Trachea midline


Cardiovascular: positive: Irregularly irregular, Bradycardia


Respiratory: positive: No respiratory distress, Diminished in bases.  negative: 

Wheezes


Abdomen: positive: Non-tender, Soft, Nml bowel sounds


Skin: positive: Rash (see HPI), Pressure wound (coccyx small open area 1 

cm/scarred wtih eschar)


Extremities: positive: No pedal edema


Neurologic/Psychiatric: positive: Disoriented to person, Disoriented to place, 

Disoriented to time, Weakness, Flat affect





Palliative Care





- POLST


Patient has POLST: Yes


POLST Status: DNR, Comfort Measures


Pain: Pain unchanged


Drowsiness/Sedation: Moderate (4-6)


Constipation: No, Managed


Performance Status: 


Patient is dependent for all ADLs, requires a Jt lift transfer to her tilt in

 space wheelchair.  Patient does spend a significant amount of time in her bed, 

she has been more tired and sleeping more per staff report








- Palliative Care


Discussion: 


Follow-up with Krystin daughter-in-law regarding findings for shingles, comfort 

measure to treat with the hope to decrease and aid time and healing





When asked patient what she was most worried about, she reports she is worried 

about her sons and who they are "hanging out with".  Denies any distress, denies

 pain or able to identify anything that could make things better.  She does 

perceive she is in her mother's home, is able to engage in conversation, but 

nonsensical and answers.





ADDENDUM 2/5 Spoke with Krystin, who will share with her  regarding goals 

of care to focus on quality of life issues.  Discussed rationale for stopping 

the insulin, as well as doubling the mirtazapine with the goal to improve 

quality of life not extend her suffering or quantity of life.  Will evaluate 

response and revisit next week for further adjustments regarding goals of care








Impression and Recommendations





- Palliative Care


Impression: 


This is an 85-year-old woman who presents with vesicular rash along her right 

thoracic dermatomes, does not appear in acute distress has been less than 72 

hours we will go ahead and treat for herpes zoster. She has advancing dementia, 

has declined functionally and cognitively with her left hemiparesis status post 

CVA 1 year ago.  She continues with intermittent skin issues, depression, and 

increased somnolence.





Recommendations/Counseling Done: 


1. Herpes zoster/shingles.  Patient's rash erupted less than 72 hours, patient 

will still qualify and benefit from valacyclovir 1000 mg 3 times daily, 

instructed staff to give as soon as available from [employer].  Instructed to keep

 ration area clean and dry, and covered until lesions are dry.  Patient does not

 present with any acute pain, is already on 3 times daily dosing for 

acetaminophen.  Will evaluate for further signs or symptoms of distress.





2.  Left-sided hemiparesis left upper extremity.  Patient is in tilt in space, 

requires Jt lift.  Does appear to have increasing contracture on her left 

side, with increased discomfort with any kind of passive range of motion.  

Patient has been noncompliant with splint in the past.





3.Diabetes type 2.  Patient's blood sugars continue to be quite low, she is only

 on Lantus 10 units daily. 





4. Sacral stage I1 pressure wound on coccyx. Patient on pressure relief 

mattress, they are using barrier cream for management.  Patient is incontinent 

of very strong urine, suspect this is adding to ongoing issues.  Staff are going

 to continue to monitor.





5.  Dementia.  Patient continues with slow and steady cognitive decline, she 

does tend to confabulate, is disoriented x3.  She is sleeping more, will change 

mirtazapine to nighttime dosing.





6. Weight loss. ADDENDUM; got weight today of 165; down from 1/26 177.2. Will 

discontinue Lantus, check blood sugars for a week, double mirtazipine to 15 mg 

and change to night time dosing secondary to sedation.











Time Spent: 


45 minutes with getting 50% of this done in counseling regarding coordination of

 care with staff for management of herpes zoster, symptom management, and 

coordination and follow-up on weight loss. Writer discussed with the patient the reasons for missed/late arrival to wound clinic appointment(s).      Unable to reach patient Via phone. This is patients 7th no call/ no show in a row at [facility].   1/12/24, 1/15, 1/17, 1/19, 1/22, 1/24, 1/26/2024.    Management and Coordinator aware.